# Patient Record
Sex: FEMALE | Race: BLACK OR AFRICAN AMERICAN | NOT HISPANIC OR LATINO | Employment: OTHER | URBAN - METROPOLITAN AREA
[De-identification: names, ages, dates, MRNs, and addresses within clinical notes are randomized per-mention and may not be internally consistent; named-entity substitution may affect disease eponyms.]

---

## 2017-01-23 ENCOUNTER — TELEPHONE (OUTPATIENT)
Dept: TRANSPLANT | Facility: CLINIC | Age: 32
End: 2017-01-23

## 2017-01-23 NOTE — TELEPHONE ENCOUNTER
Patient approved for initial consult visit.  Per Dr. Burciaga patient can be scheduled with other physicians.

## 2017-01-23 NOTE — TELEPHONE ENCOUNTER
Records received for OHT to see Dr. Burciaga. Patient will be scheduled  Accordingly after approval received. OHT episode created.

## 2017-01-23 NOTE — TELEPHONE ENCOUNTER
Someone picked up , then hung up    In efforts to get them again another attempt made in which receive message that there is no voice mail set up

## 2017-01-23 NOTE — TELEPHONE ENCOUNTER
"Attempted to reach pt no voice mail set up.       REFERRAL NOTE:    Cj Silva has been referred to the pre-heart transplant office for consideration for orthotopic heart transplantation by Kendall Donnelly .  Patient's appointments have been scheduled for 1/31/2017.  Information was provided and questions were answered.  Copies of "What to Expect" and "Cardiomyopathy and Heart Transplant" were mailed to the patient.  "

## 2017-01-24 ENCOUNTER — TELEPHONE (OUTPATIENT)
Dept: TRANSPLANT | Facility: CLINIC | Age: 32
End: 2017-01-24

## 2017-01-24 NOTE — TELEPHONE ENCOUNTER
Called in efforts to reach patient to inform her in regards to upcoming scheduled appointment. Patient does not have voicemail set up on phone.

## 2017-02-01 ENCOUNTER — TELEPHONE (OUTPATIENT)
Dept: TRANSPLANT | Facility: CLINIC | Age: 32
End: 2017-02-01

## 2017-02-01 NOTE — TELEPHONE ENCOUNTER
Attempted to contact patient to reschedule appt, did not arrive for scheduled appt on 1/31/17. Unable to leave voicemail, mailbox not set up.

## 2017-02-03 ENCOUNTER — TELEPHONE (OUTPATIENT)
Dept: TRANSPLANT | Facility: CLINIC | Age: 32
End: 2017-02-03

## 2017-02-03 NOTE — TELEPHONE ENCOUNTER
Attempted second time to contact patient to reschedule appt, no show for appt on 1/31/17. Unable to leave voicemail, mailbox not set up.

## 2017-02-06 ENCOUNTER — TELEPHONE (OUTPATIENT)
Dept: TRANSPLANT | Facility: CLINIC | Age: 32
End: 2017-02-06

## 2017-02-06 NOTE — TELEPHONE ENCOUNTER
Attempted to contact patient to reschedule appt, no show for appt on 1/31/17. Unable to leave voicemail, mailbox not set up.

## 2017-02-16 ENCOUNTER — TELEPHONE (OUTPATIENT)
Dept: TRANSPLANT | Facility: CLINIC | Age: 32
End: 2017-02-16

## 2017-02-16 NOTE — TELEPHONE ENCOUNTER
----- Message from Teresita Thompson sent at 2/15/2017  4:10 PM CST -----  Contact: Irene([facility] [place])  Irene([facility] [place]) called, requesting to speak with you in regards to pt's missed appointment.  [phone]. Thank you

## 2017-02-16 NOTE — TELEPHONE ENCOUNTER
Spoke with Irene for Dr Donnelly (Referring physician) updated her on patient new appointment date and time. Understanding verbalized no further questions or concerns at this time.

## 2017-02-16 NOTE — TELEPHONE ENCOUNTER
Received a fax of about 11 pages of recent clinic visit on 2/15/17 from MARY ANN Simpson a copy was made and send to be scanned into patient chart will be viewable under media tab. A copy was given to RUPINDER Solo with notation that patient was scheduled for tomorrow 2/17/17

## 2017-02-16 NOTE — TELEPHONE ENCOUNTER
Spoke with patient, update demographics , rescheduled her appointment for tomorrow, Friday Feb. 17th for 1030 with Dr. Euceda with labs for 0930

## 2017-02-16 NOTE — TELEPHONE ENCOUNTER
----- Message from Teresita Thompson sent at 2/15/2017  4:21 PM CST -----  Contact: pt called  Pt called, states she will like to schedule an upcoming appointment.  [phone]. Thank you

## 2017-02-17 ENCOUNTER — EDUCATION (OUTPATIENT)
Dept: TRANSPLANT | Facility: CLINIC | Age: 32
End: 2017-02-17

## 2017-02-17 ENCOUNTER — INITIAL CONSULT (OUTPATIENT)
Dept: TRANSPLANT | Facility: CLINIC | Age: 32
End: 2017-02-17
Payer: MEDICAID

## 2017-02-17 ENCOUNTER — DOCUMENTATION ONLY (OUTPATIENT)
Dept: TRANSPLANT | Facility: CLINIC | Age: 32
End: 2017-02-17

## 2017-02-17 VITALS
HEART RATE: 79 BPM | DIASTOLIC BLOOD PRESSURE: 69 MMHG | WEIGHT: 153 LBS | BODY MASS INDEX: 28.89 KG/M2 | HEIGHT: 61 IN | SYSTOLIC BLOOD PRESSURE: 114 MMHG

## 2017-02-17 DIAGNOSIS — R42 POSITIONAL LIGHTHEADEDNESS: ICD-10-CM

## 2017-02-17 PROCEDURE — 99204 OFFICE O/P NEW MOD 45 MIN: CPT | Mod: S$PBB,,, | Performed by: INTERNAL MEDICINE

## 2017-02-17 PROCEDURE — 99999 PR PBB SHADOW E&M-EST. PATIENT-LVL III: CPT | Mod: PBBFAC,,, | Performed by: INTERNAL MEDICINE

## 2017-02-17 PROCEDURE — 99213 OFFICE O/P EST LOW 20 MIN: CPT | Mod: PBBFAC | Performed by: INTERNAL MEDICINE

## 2017-02-17 RX ORDER — SPIRONOLACTONE 25 MG/1
25 TABLET ORAL DAILY
COMMUNITY
End: 2022-03-02 | Stop reason: SDUPTHER

## 2017-02-17 RX ORDER — FUROSEMIDE 20 MG/1
20 TABLET ORAL EVERY OTHER DAY
COMMUNITY

## 2017-02-17 RX ORDER — POTASSIUM CHLORIDE 20 MEQ/1
20 TABLET, EXTENDED RELEASE ORAL EVERY OTHER DAY
COMMUNITY
End: 2018-12-05

## 2017-02-17 RX ORDER — CARVEDILOL 25 MG/1
25 TABLET ORAL 2 TIMES DAILY WITH MEALS
COMMUNITY

## 2017-02-17 NOTE — PROGRESS NOTES
At the request of Dr. Euceda, I have been asked to meet patient and provide VAD education. Introduced self and reason for visit. Pt and Walter CONLEY.  Provided written VAD education (red folder). Included in folder is the following: VAD education, wellness contract, acknowledgement of being evaluated for VAD, support group flier, ICU visitation hours, VAD newsletter, Intermacs information, picture of VAD  In body, In[employer] pamphlet, Living with HM II DVD, There is hope pamphlet, Tomorrow depends on the decision we make today patient education pack (HM II), [employer] information, and business cards for all VAD coordinators. Explained that we use 3 different types of pumps here and information on both pumps is in the red folder. I explained the work up process as well.     Explained to look over the entire contents and read the wellness contract, caregiver agreement and acknowledgement form.  Also explained they should bring this folder with them to all clinic visits and if they are admitted to the hospital so we can continue education as needed. Should there be any questions, please write them down and bring with you or feel free to call and we can talk on the phone. All questions answered to their satisfaction as evidence by verbal acknowledgement.

## 2017-02-17 NOTE — LETTER
February 21, 2017        Kendall Donnelly  [address]  [address]  [place] [address]  Phone: [phone]  Fax: [phone]             [facility]  [address]  [facility] [address]  Phone: [phone]   Patient: Cj Silva   MR Number: [id number]   YOB: 1985   Date of Visit: 2/17/2017       Dear Dr. Kendall Donnelly    Thank you for referring Cj Silva to me for evaluation. Attached you will find relevant portions of my assessment and plan of care.    If you have questions, please do not hesitate to call me. I look forward to following Cj Silva along with you.    Sincerely,    Terra Euceda MD    Enclosure    If you would like to receive this communication electronically, please contact [email or web address] or [phone] to request [employer] Link access.    [employer] Link is a tool which provides read-only access to select patient information with whom you have a relationship. Its easy to use and provides real time access to review your patients record including encounter summaries, notes, results, and demographic information.    If you feel you have received this communication in error or would no longer like to receive these types of communications, please e-mail [email or web address]

## 2017-02-17 NOTE — PROGRESS NOTES
Subjective:   Initial evaluation of heart transplant candidacy.    HPI:  Ms. Silva is a 31 y.o. year old Black or  female who has presents to be considered for advanced surgical options (LVAD/OHT). 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 No complications with any of her deliveries. Patient started on medical therapy, admitted to hospital, diuresed, and subsequently has had LifeVest. Despite medical therapy patient states she continues to have symptoms of fatigue, gets tired doing her household chores. Additionally states she has dizziness not necessarily associated with position changes. Some VO, but not with mild activity. Denies N/V/F/C, lightheadedness, dizziness, PND, orthopnea, LE edema, abdominal pain, abdominal pressure. No bloating. No leg swelling anymore.       Past Medical History   Diagnosis Date    Cardiomyopathy     CHF (congestive heart failure)     Hypertension      History reviewed. No pertinent past surgical history.    Family History   Problem Relation Age of Onset    Hypertension Mother     Cancer Mother     Hypertension Father        Review of Systems   Constitution: Positive for malaise/fatigue. Negative for chills, decreased appetite, diaphoresis, fever, weakness, weight gain and weight loss.   HENT: Negative for headaches.    Eyes: Negative for visual disturbance.   Cardiovascular: Positive for dyspnea on exertion. Negative for chest pain, cyanosis, irregular heartbeat, leg swelling, near-syncope, orthopnea (3 pillows), palpitations, paroxysmal nocturnal dyspnea and syncope.   Respiratory: Positive for shortness of breath. Negative for cough, sleep disturbances due to breathing, snoring, sputum production and wheezing.   "  Hematologic/Lymphatic: Negative for adenopathy and bleeding problem. Does not bruise/bleed easily.   Skin: Negative for color change, poor wound healing, rash, skin cancer and suspicious lesions.   Musculoskeletal: Negative for back pain, falls, gout, joint pain and muscle weakness.   Gastrointestinal: Negative for bloating, abdominal pain, anorexia, constipation, diarrhea, heartburn, hematemesis, hematochezia, melena, nausea and vomiting.   Genitourinary: Negative for nocturia and urgency.   Neurological: Positive for dizziness. Negative for excessive daytime sleepiness, focal weakness, light-headedness, paresthesias and tremors.   Psychiatric/Behavioral: Negative for depression and memory loss. The patient does not have insomnia and is not nervous/anxious.        Objective:   Blood pressure 114/69, pulse 79, height 5' 1" (1.549 m), weight 69.4 kg (153 lb), last menstrual period 02/14/2017.body mass index is 28.91 kg/(m^2).    Physical Exam   Constitutional: She is oriented to person, place, and time. She appears well-developed and well-nourished. No distress.   HENT:   Head: Normocephalic and atraumatic.   Right Ear: External ear normal.   Left Ear: External ear normal.   Nose: Nose normal.   Mouth/Throat: Oropharynx is clear and moist. No oropharyngeal exudate.   Eyes: Pupils are equal, round, and reactive to light. No scleral icterus.   Neck: Trachea normal and normal range of motion. Neck supple. No hepatojugular reflux and no JVD present.   Cardiovascular: Normal rate, regular rhythm, S1 normal, S2 normal, intact distal pulses and normal pulses.  PMI is not displaced.  Exam reveals gallop.    Pulmonary/Chest: Effort normal and breath sounds normal. She has no wheezes. She has no rhonchi. She has no rales.   Abdominal: Soft. Normal appearance and bowel sounds are normal. She exhibits no distension, no ascites and no mass. There is no tenderness.   Lymphadenopathy:     She has no cervical adenopathy. "   Neurological: She is alert and oriented to person, place, and time. Gait normal.   Skin: Skin is warm, dry and intact. She is not diaphoretic. No cyanosis. Nails show no clubbing.   Psychiatric: She has a normal mood and affect. Her speech is normal.   Nursing note and vitals reviewed.      Labs:      Chemistry        Component Value Date/Time     02/17/2017 1123    K 4.1 02/17/2017 1123     02/17/2017 1123    CO2 27 02/17/2017 1123    BUN 8 02/17/2017 1123    CREATININE 0.9 02/17/2017 1123    GLU 89 02/17/2017 1123        Component Value Date/Time    CALCIUM 9.5 02/17/2017 1123    ALKPHOS 94 02/17/2017 1123    AST 15 02/17/2017 1123    ALT 12 02/17/2017 1123    BILITOT 0.4 02/17/2017 1123          No results found for: MG  Lab Results   Component Value Date    WBC 5.16 02/17/2017    HGB 13.2 02/17/2017    HCT 42.5 02/17/2017    MCV 84 02/17/2017     02/17/2017     BNP   Date Value Ref Range Status   02/17/2017 163 (H) 0 - 99 pg/mL Final     Comment:     Values of less than 100 pg/ml are consistent with non-CHF populations.       Labs were reviewed with the patient.    Assessment:      1. Peripartum cardiomyopathy    2. Positional lightheadedness        Plan:   Patient appears to be well compensated on exam today, however difficult to say for sure, as a young patient, can mask symptoms/illness. Would like to proceed with risk stratification to see where we stand. Does seem difficult for patient to travel here, as she cancelled the last couple appointments before making the first one- will see if she can return in  2-3 weeks for CPX. Echo later today if possible. If functional status indicates, will proceed with RHC and possible evaluation for advanced options.      Patient is now NYHA III ACC stage D  Recommend 2 gram sodium restriction and 1500cc fluid restriction.  Encourage physical activity with graded exercise program.  Requested patient to weigh themselves daily, and to notify us if their  weight increases by more than 3 lbs in 1 day or 5 lbs in 1 week.     Transplant Candidacy: Patient is a 31 y.o. year old female with heart failure is being seen for possible OHT. In my opinion, she is  a suitable OHT candidate. Patient did meet with MCS and/or pre-transplant coordinator at the end of this visit for workup. she is scheduled for risk stratification testing.    UNOS Patient Status  Functional Status: 70% - Cares for self: unable to carry on normal activity or active work  Physical Capacity: No Limitations  Working for Income: No  If no, reason not working: Patient Choice - Homemaker    Terra Euceda MD

## 2017-02-17 NOTE — PROGRESS NOTES
PRE-EDUCATION BOOKLET NOTE:    Met with Jodicorinne DelgadilloRicardo, accompanied by her significant other, had a brief discussion on the heart transplant evaluation process.    Heart Transplant Educational Booklet given to patient, which included the following handouts:  · Cardiomyopathy and Heart Transplantation  · Pre-transplant Evaluation Process  · Ventricular Assist Devices  · Wellness Contract  · Heart Transplant Information Outline  · Recipient Informed Consent  · Discharge Instructions for Patients with Heart Failure  · Advanced Directives  · Suggested web sites  · Multiple listing protocol and UNOS toll free numbers    Contact information provided.  All questions answered to patient's satisfaction.  Educational session to be arranged per .  Patient instructed to bring heart transplant educational booklet to the education session. Voiced understanding.

## 2017-02-17 NOTE — MR AVS SNAPSHOT
[facility]  [address]  [place] [address]  Phone: [phone]                  Cj Silva   2017 10:30 AM   Initial consult    Description:  Female : 1985   Provider:  Terra Euceda MD   Department:  [facility]           Reason for Visit     Heart Transplant Pre-evaluation           Diagnoses this Visit        Comments    Peripartum cardiomyopathy    -  Primary     Positional lightheadedness                To Do List           Future Appointments        Provider Department Dept Phone    3/21/2017 9:15 AM CPX [facility] - Echo/Stress Lab [phone]    3/21/2017 11:00 AM ECHO, [facility] - Echo/Stress Lab [phone]    3/21/2017 1:30 PM Terra Euceda MD [facility] [phone]      Goals (5 Years of Data)     None      Follow-Up and Disposition     Return in about 4 weeks (around 3/17/2017) for clinic, labs, cpx next visit; echo today at 1pm.      [facility]     [facility] Nurse Care Line - 24/7 Assistance  Registered nurses in the [facility] provide clinical advisement, health education, appointment booking, and other advisory services.  Call for this free service at [phone].             Medications           Message regarding Medications     Verify the changes and/or additions to your medication regime listed below are the same as discussed with your clinician today.  If any of these changes or additions are incorrect, please notify your healthcare provider.             Verify that the below list of medications is an accurate representation of the medications you are currently taking.  If none reported, the list may be blank. If incorrect, please contact your healthcare provider. Carry this list with you in case of emergency.           Current Medications     carvedilol (COREG) 25 MG tablet Take 25 mg by mouth 2 (two) times daily with meals.    furosemide (LASIX) 20 MG tablet Take 20 mg by  "mouth 2 (two) times daily.    potassium chloride SA (K-DUR,KLOR-CON) 20 MEQ tablet Take 20 mEq by mouth once daily.    sacubitril-valsartan (ENTRESTO) 24-26 mg per tablet Take 1 tablet by mouth 2 (two) times daily.    spironolactone (ALDACTONE) 25 MG tablet Take 25 mg by mouth once daily.           Clinical Reference Information           Your Vitals Were     BP Pulse Height Weight Last Period BMI    114/69 (BP Location: Right arm, Patient Position: Sitting, BP Method: Automatic) 79 5' 1" (1.549 m) 69.4 kg (153 lb) 02/14/2017 28.91 kg/m2      Blood Pressure          Most Recent Value    BP  114/69      Allergies as of 2/17/2017     No Known Allergies      Immunizations Administered on Date of Encounter - 2/17/2017     None      Orders Placed During Today's Visit     Future Labs/Procedures Expected by Expires    2D Echo w/ Color Flow Doppler  As directed 2/17/2018    Recurring Lab Work Interval Expires    CPX STUDY   2/17/2018      Maintenance Dialysis History     Patient has no recorded history of maintenance dialysis.      Transplant Information        Txp Date Organ Coordinator Care Team     Heart Tatiana Manriquez Referring Physician:  Kendall Donnelly MD   Corresponding Physician:  Kendall Donnelly MD         [facility] Sign-Up     Activating your [facility] account is as easy as 1-2-3!     1) Visit [email or web address], select Sign Up Now, enter this activation code and your date of birth, then select Next.  [email or web address]  Expires: 4/3/2017 12:26 PM      2) Create a username and password to use when you visit [facility] in the future and select a security question in case you lose your password and select Next.    3) Enter your e-mail address and click Sign Up!    Additional Information  If you have questions, please e-mail [email or web address] or call [phone] to talk to our [facility] staff. Remember, [facility] is NOT to be used for urgent needs. For medical emergencies, dial 911.         Language Assistance Services  "    ATTENTION: Language assistance services are available, free of charge. Please call [phone].      ATENCIÓN: Si habla debi, tiene a rajput disposición servicios gratuitos de asistencia lingüística. Llame al [phone].     CHÚ Ý: N?u b?n nói Ti?ng Vi?t, có các d?ch v? h? tr? ngôn ng? mi?n phí dành cho b?n. G?i s? [phone].         [facility] complies with applicable Federal civil rights laws and does not discriminate on the basis of race, color, national origin, age, disability, or sex.

## 2017-03-21 ENCOUNTER — DOCUMENTATION ONLY (OUTPATIENT)
Dept: CARDIOLOGY | Facility: CLINIC | Age: 32
End: 2017-03-21

## 2017-03-21 ENCOUNTER — OFFICE VISIT (OUTPATIENT)
Dept: TRANSPLANT | Facility: CLINIC | Age: 32
End: 2017-03-21
Payer: MEDICAID

## 2017-03-21 ENCOUNTER — HOSPITAL ENCOUNTER (OUTPATIENT)
Dept: CARDIOLOGY | Facility: CLINIC | Age: 32
Discharge: HOME | End: 2017-03-21
Payer: MEDICAID

## 2017-03-21 VITALS
BODY MASS INDEX: 29.1 KG/M2 | HEIGHT: 61 IN | WEIGHT: 154.13 LBS | DIASTOLIC BLOOD PRESSURE: 77 MMHG | SYSTOLIC BLOOD PRESSURE: 113 MMHG | HEART RATE: 74 BPM

## 2017-03-21 DIAGNOSIS — R42 POSITIONAL LIGHTHEADEDNESS: Primary | ICD-10-CM

## 2017-03-21 DIAGNOSIS — I42.9 CARDIOMYOPATHY: ICD-10-CM

## 2017-03-21 DIAGNOSIS — R42 POSITIONAL LIGHTHEADEDNESS: ICD-10-CM

## 2017-03-21 LAB
DIASTOLIC DYSFUNCTION: NO
ESTIMATED PA SYSTOLIC PRESSURE: 26
RETIRED EF AND QEF - SEE NOTES: 20 (ref 55–65)
TRICUSPID VALVE REGURGITATION: ABNORMAL

## 2017-03-21 PROCEDURE — 99999 PR PBB SHADOW E&M-EST. PATIENT-LVL III: CPT | Mod: PBBFAC,,, | Performed by: INTERNAL MEDICINE

## 2017-03-21 PROCEDURE — 93306 TTE W/DOPPLER COMPLETE: CPT | Mod: 26,S$PBB,, | Performed by: INTERNAL MEDICINE

## 2017-03-21 PROCEDURE — 94621 CARDIOPULM EXERCISE TESTING: CPT | Mod: 26,S$PBB,, | Performed by: INTERNAL MEDICINE

## 2017-03-21 PROCEDURE — 99213 OFFICE O/P EST LOW 20 MIN: CPT | Mod: S$PBB,,, | Performed by: INTERNAL MEDICINE

## 2017-03-21 NOTE — MR AVS SNAPSHOT
[facility]  [address]  [place] [address]  Phone: [phone]                  Cj Silva   3/21/2017 1:30 PM   Office Visit    Description:  Female : 1985   Provider:  Terra Euceda MD   Department:  [facility]           Reason for Visit     Heart Transplant Pre-evaluation           Diagnoses this Visit        Comments    Positional lightheadedness    -  Primary     Peripartum cardiomyopathy                To Do List           Future Appointments        Provider Department Dept Phone    3/21/2017 2:30 PM HEARTTRANSPLANT, [facility] [phone]      Goals (5 Years of Data)     None      Follow-Up and Disposition     Return in about 3 months (around 2017) for clinic, labs, cpx.      [facility]     [facility] Nurse Care Line -  Assistance  Registered nurses in the [facility] provide clinical advisement, health education, appointment booking, and other advisory services.  Call for this free service at [phone].             Medications           Message regarding Medications     Verify the changes and/or additions to your medication regime listed below are the same as discussed with your clinician today.  If any of these changes or additions are incorrect, please notify your healthcare provider.             Verify that the below list of medications is an accurate representation of the medications you are currently taking.  If none reported, the list may be blank. If incorrect, please contact your healthcare provider. Carry this list with you in case of emergency.           Current Medications     carvedilol (COREG) 25 MG tablet Take 25 mg by mouth 2 (two) times daily with meals.    furosemide (LASIX) 20 MG tablet Take 20 mg by mouth 2 (two) times daily.    potassium chloride SA (K-DUR,KLOR-CON) 20 MEQ tablet Take 20 mEq by mouth once daily.    sacubitril-valsartan (ENTRESTO) 24-26 mg per tablet Take 1  "tablet by mouth 2 (two) times daily.    spironolactone (ALDACTONE) 25 MG tablet Take 25 mg by mouth once daily.           Clinical Reference Information           Your Vitals Were     BP Pulse Height Weight BMI    113/77 74 5' 1" (1.549 m) 69.9 kg (154 lb 1.6 oz) 29.12 kg/m2      Blood Pressure          Most Recent Value    BP  113/77      Allergies as of 3/21/2017     No Known Allergies      Immunizations Administered on Date of Encounter - 3/21/2017     None      Orders Placed During Today's Visit     Recurring Lab Work Interval Expires    Brain natriuretic peptide   3/22/2019    Comprehensive metabolic panel   3/22/2019    CPX STUDY   3/21/2018      Maintenance Dialysis History     Patient has no recorded history of maintenance dialysis.      Transplant Information        Txp Date Organ Coordinator Care Team     Heart Tatiana Mitra Referring Physician:  Kendall Donnelly MD   Corresponding Physician:  Kendall Donnelly MD         [facility] Sign-Up     Activating your [facility] account is as easy as 1-2-3!     1) Visit [email or web address], select Sign Up Now, enter this activation code and your date of birth, then select Next.  [email or web address]  Expires: 4/3/2017  1:26 PM      2) Create a username and password to use when you visit [facility] in the future and select a security question in case you lose your password and select Next.    3) Enter your e-mail address and click Sign Up!    Additional Information  If you have questions, please e-mail [email or web address] or call [phone] to talk to our [facility] staff. Remember, [facility] is NOT to be used for urgent needs. For medical emergencies, dial 911.         Language Assistance Services     ATTENTION: Language assistance services are available, free of charge. Please call [phone].      ATENCIÓN: Si habla debi, tiene a rajput disposición servicios gratuitos de asistencia lingüística. Llame al [phone].     CHÚ Ý: N?u b?n nói Ti?ng Vi?t, có các d?ch v? h? " tr? matt hay? mi?n phí dành cho b?n. G?i s? [phone].         [facility] complies with applicable Federal civil rights laws and does not discriminate on the basis of race, color, national origin, age, disability, or sex.

## 2017-03-21 NOTE — LETTER
March 26, 2017        Kendall Donnelly  [address]  [address]  [place] [address]  Phone: [phone]  Fax: [phone]             [facility]  [address]  [facility] [address]  Phone: [phone]   Patient: Cj Silva   MR Number: [id number]   YOB: 1985   Date of Visit: 3/21/2017       Dear Dr. Kendall Donnelly    Thank you for referring Cj Silva to me for evaluation. Attached you will find relevant portions of my assessment and plan of care.    If you have questions, please do not hesitate to call me. I look forward to following Cj Silva along with you.    Sincerely,    Terra Euceda MD    Enclosure    If you would like to receive this communication electronically, please contact [email or web address] or [phone] to request "[employer] Link access.    "[employer] Link is a tool which provides read-only access to select patient information with whom you have a relationship. Its easy to use and provides real time access to review your patients record including encounter summaries, notes, results, and demographic information.    If you feel you have received this communication in error or would no longer like to receive these types of communications, please e-mail [email or web address]

## 2017-03-21 NOTE — PROGRESS NOTES
Patient identified by 2 identifiers. Denies previous reactions to blood transfusions, and no known holes in heart.  Allergies reviewed.  Procedure explained & consent obtained.  22 g IV placed to Rt FA after failed attemp, flushed w/ 10cc NS pre & post contrast administration.  3cc Optison administered, echo images obtained.  Pt tolerated procedure well.  IV D/C'ed, preasure dsg applied.  Pt D/C'ed to home.

## 2017-03-21 NOTE — PROGRESS NOTES
Subjective:   Initial evaluation of heart transplant candidacy.    HPI:  Ms. Silva is a 31 y.o. year old Black or  female who has presents to be considered for advanced surgical options (LVAD/OHT). 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 Initial consult a few weeks prior, has been doing well since then, and actually states she has improved significantly since then. Able to do a lot more, feels stronger, less dizziness although she admits is a lot better. Not getting short of breath with activities at home. Denies N/V/F/C, lightheadedness, dizziness, PND, orthopnea, LE edema, abdominal pain, abdominal pressure. No bloating. No leg swelling anymore.     TTE 03/21/17:    1 - Severely depressed left ventricular systolic function (EF 20-25%).     2 - Mild left atrial enlargement.     3 - Low normal to mildly depressed right ventricular systolic function .     4 - The estimated PA systolic pressure is 26 mmHg.     5 - Mild tricuspid regurgitation.     CPX 03/21/17:  1)  Resting spirometry reveals an FVC = 2.1L which is 66% of predicted, an FEV1 of 1.7L, which is 61% of predicted and an FEV1/FVC ratio of 81%. The MVV = 80 L/min, which is 78% of predicted.  2) The respiratory exchange ratio (RER) was 1.03, suggesting an adequate effort.  3) The breathing reserve is calculated at 14%, which is reduced. Oxygen saturation with exercise remained normal. Due to the absence of symptoms of dyspnea, this reduction in breathing reserve may be due to a sub-clinical ventilatory defect versus a falsely reduced baseline MVV   4) The PkVO2 was 22.3 ml/kg/min which is 60% of predicted equating to a functional capacity of 6.4 METS indicating moderate functional impairment.   5) The anaerobic threshold (AT), which occurred at a heart rate of 125bpm, was 18.5  "ml/kg/min, which is 50% of the predicted VO2 and is normal.   6) The PkVO2 Lean was 29.15 ml/kg of lean body weight/min indicating a good prognosis in heart failure.   7) The VE/VCO2 decreased by -24% from rest to AT. The VE/VCO2 [place] was 36.3.  The Resting PetCO2 was 33.0.     Review of Systems   Constitution: Positive for malaise/fatigue. Negative for chills, decreased appetite, diaphoresis, fever, weakness, weight gain and weight loss.   HENT: Negative for headaches.    Eyes: Negative for visual disturbance.   Cardiovascular: Positive for dyspnea on exertion. Negative for chest pain, cyanosis, irregular heartbeat, leg swelling, near-syncope, orthopnea (down to 1 pillow at night, no PND), palpitations, paroxysmal nocturnal dyspnea and syncope.   Respiratory: Positive for shortness of breath. Negative for cough, sleep disturbances due to breathing, snoring, sputum production and wheezing.    Hematologic/Lymphatic: Negative for adenopathy and bleeding problem. Does not bruise/bleed easily.   Skin: Negative for color change, poor wound healing, rash, skin cancer and suspicious lesions.   Musculoskeletal: Negative for back pain, falls, gout, joint pain and muscle weakness.   Gastrointestinal: Negative for bloating, abdominal pain, anorexia, constipation, diarrhea, heartburn, hematemesis, hematochezia, melena, nausea and vomiting.   Genitourinary: Negative for nocturia and urgency.   Neurological: Positive for dizziness. Negative for excessive daytime sleepiness, focal weakness, light-headedness, paresthesias and tremors.   Psychiatric/Behavioral: Negative for depression and memory loss. The patient does not have insomnia and is not nervous/anxious.        Objective:   Blood pressure 113/77, pulse 74, height 5' 1" (1.549 m), weight 69.9 kg (154 lb 1.6 oz).body mass index is 29.12 kg/(m^2).    Physical Exam   Constitutional: She is oriented to person, place, and time. She appears well-developed and well-nourished. No " distress.   HENT:   Head: Normocephalic and atraumatic.   Right Ear: External ear normal.   Left Ear: External ear normal.   Nose: Nose normal.   Mouth/Throat: Oropharynx is clear and moist. No oropharyngeal exudate.   Eyes: Pupils are equal, round, and reactive to light. No scleral icterus.   Neck: Trachea normal and normal range of motion. Neck supple. No hepatojugular reflux and no JVD present.   Cardiovascular: Normal rate, regular rhythm, S1 normal, S2 normal, intact distal pulses and normal pulses.  PMI is not displaced.  Exam reveals gallop.    Pulmonary/Chest: Effort normal and breath sounds normal. She has no wheezes. She has no rhonchi. She has no rales.   Abdominal: Soft. Normal appearance and bowel sounds are normal. She exhibits no distension, no ascites and no mass. There is no tenderness.   Lymphadenopathy:     She has no cervical adenopathy.   Neurological: She is alert and oriented to person, place, and time. Gait normal.   Skin: Skin is warm, dry and intact. She is not diaphoretic. No cyanosis. Nails show no clubbing.   Psychiatric: She has a normal mood and affect. Her speech is normal.   Nursing note and vitals reviewed.    Labs:      Chemistry        Component Value Date/Time     02/17/2017 1123    K 4.1 02/17/2017 1123     02/17/2017 1123    CO2 27 02/17/2017 1123    BUN 8 02/17/2017 1123    CREATININE 0.9 02/17/2017 1123    GLU 89 02/17/2017 1123        Component Value Date/Time    CALCIUM 9.5 02/17/2017 1123    ALKPHOS 94 02/17/2017 1123    AST 15 02/17/2017 1123    ALT 12 02/17/2017 1123    BILITOT 0.4 02/17/2017 1123          Lab Results   Component Value Date    WBC 5.16 02/17/2017    HGB 13.2 02/17/2017    HCT 42.5 02/17/2017    MCV 84 02/17/2017     02/17/2017     BNP   Date Value Ref Range Status   02/17/2017 163 (H) 0 - 99 pg/mL Final     Comment:     Values of less than 100 pg/ml are consistent with non-CHF populations.       Labs were reviewed with the  patient.    Assessment:      1. Positional lightheadedness    2. Peripartum cardiomyopathy        Plan:   Patient additionally states she is still feeling some positional dizziness, however on exam her volume status appears euvolemic, not hypovelemic, and she states her blood pressures do not change. If this is resolved when she sees her local cardiologist, would recommend increasing the entresto to the next highest dose. Will send a copy of this note to her local cardiologist who she is going to follow up with in April.    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   Will have them return to see us in clinic in 3 months with repeat CPX, anticipate she will have an ICD placed before then.      Patient is now NYHA III ACC stage D  Recommend 2 gram sodium restriction and 1500cc fluid restriction.  Encourage physical activity with graded exercise program.  Requested patient to weigh themselves daily, and to notify us if their weight increases by more than 3 lbs in 1 day or 5 lbs in 1 week.     Transplant Candidacy: Patient is a 31 y.o. year old female with heart failure is being seen for possible OHT. In my opinion, she is  a suitable OHT candidate. Patient did meet with [facility]CS and/or pre-transplant coordinator at the end of this visit for workup. she is scheduled for risk stratification testing.    UNOS Patient Status  Functional Status: 70% - Cares for self: unable to carry on normal activity or active work  Physical Capacity: No Limitations  Working for Income: No  If no, reason not working: Patient Choice - Homemaker    Terra Euceda MD

## 2017-06-19 ENCOUNTER — HOSPITAL ENCOUNTER (OUTPATIENT)
Dept: CARDIOLOGY | Facility: CLINIC | Age: 32
Discharge: HOME | End: 2017-06-19
Payer: MEDICAID

## 2017-06-19 ENCOUNTER — OFFICE VISIT (OUTPATIENT)
Dept: TRANSPLANT | Facility: CLINIC | Age: 32
End: 2017-06-19
Payer: MEDICAID

## 2017-06-19 ENCOUNTER — CLINICAL SUPPORT (OUTPATIENT)
Dept: TRANSPLANT | Facility: CLINIC | Age: 32
End: 2017-06-19
Payer: MEDICAID

## 2017-06-19 VITALS
SYSTOLIC BLOOD PRESSURE: 137 MMHG | WEIGHT: 171.31 LBS | DIASTOLIC BLOOD PRESSURE: 80 MMHG | HEART RATE: 59 BPM | HEIGHT: 61 IN | BODY MASS INDEX: 32.34 KG/M2

## 2017-06-19 DIAGNOSIS — R42 POSITIONAL LIGHTHEADEDNESS: ICD-10-CM

## 2017-06-19 LAB — DIASTOLIC DYSFUNCTION: NO

## 2017-06-19 PROCEDURE — 94621 CARDIOPULM EXERCISE TESTING: CPT | Mod: PBBFAC,NTX | Performed by: INTERNAL MEDICINE

## 2017-06-19 PROCEDURE — 99214 OFFICE O/P EST MOD 30 MIN: CPT | Mod: S$PBB,NTX,, | Performed by: INTERNAL MEDICINE

## 2017-06-19 PROCEDURE — 99999 PR PBB SHADOW E&M-EST. PATIENT-LVL III: CPT | Mod: PBBFAC,TXP,, | Performed by: INTERNAL MEDICINE

## 2017-06-19 NOTE — PROGRESS NOTES
Subjective:   Initial evaluation of heart transplant candidacy.    HPI:  Ms. Silva is a 31 y.o. year old Black or  female who has presents to be considered for advanced surgical options (LVAD/OHT). 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 Patient has progressively improved significantly since I first met her, today states she has no furhter symptoms, no dizziness/lightheadedness even, and can do everything she wants to do, truly describes NYHA FC I functionality.  Denies N/V/F/C, lightheadedness, dizziness, PND, orthopnea, LE edema, abdominal pain, abdominal pressure. Home blood pressures running in the 120s.     TTE 03/21/17:    1 - Severely depressed left ventricular systolic function (EF 20-25%).     2 - Mild left atrial enlargement.     3 - Low normal to mildly depressed right ventricular systolic function .     4 - The estimated PA systolic pressure is 26 mmHg.     5 - Mild tricuspid regurgitation.     CPX 06/19/17:  1)  Resting spirometry reveals an FVC = 2.2L which is 69% of predicted, an FEV1 of 1.9L, which is 66% of predicted and an FEV1/FVC ratio of 83%. The MVV = 74 L/min, which is 72% of predicted.  2) The respiratory exchange ratio (RER) was 1.19, suggesting a good effort.  3) The breathing reserve is calculated at 21%, which is borderline reduced. Oxygen saturation with exercise remained normal. Due to the absence of symptoms of dyspnea, this reduction in breathing reserve may be due to a sub-clinical ventilatory defect versus a falsely reduced baseline MVV   4) The PkVO2 was 20.1 ml/kg/min which is 54% of predicted equating to a functional capacity of 5.7 METS indicating moderate to severe functional impairment.   5) The anaerobic threshold (AT), which occurred at a heart rate of 137bpm, was 14.4 ml/kg/min, which is 39% of  "the predicted VO2 and is reduced.   6) The PkVO2 Lean was 26.73 ml/kg of lean body weight/min indicating a good prognosis in heart failure.   7) The VE/VCO2 decreased by -43% from rest to AT. The VE/VCO2 [place] was 25.5.  The Resting PetCO2 was 35.8.      Review of Systems   Constitution: Positive for weight gain. Negative for chills, decreased appetite, diaphoresis, fever, weakness, malaise/fatigue and weight loss.   HENT: Negative for headaches.    Eyes: Negative for visual disturbance.   Cardiovascular: Negative for chest pain, cyanosis, dyspnea on exertion, irregular heartbeat, leg swelling, near-syncope, orthopnea (down to 1 pillow at night, no PND), palpitations, paroxysmal nocturnal dyspnea and syncope.   Respiratory: Negative for cough, shortness of breath, sleep disturbances due to breathing, snoring, sputum production and wheezing.    Hematologic/Lymphatic: Negative for adenopathy and bleeding problem. Does not bruise/bleed easily.   Skin: Negative for color change, poor wound healing, rash, skin cancer and suspicious lesions.   Musculoskeletal: Negative for back pain, falls, gout, joint pain and muscle weakness.   Gastrointestinal: Negative for bloating, abdominal pain, anorexia, constipation, diarrhea, heartburn, hematemesis, hematochezia, melena, nausea and vomiting.   Genitourinary: Negative for nocturia and urgency.   Neurological: Negative for excessive daytime sleepiness, dizziness, focal weakness, light-headedness, paresthesias and tremors.   Psychiatric/Behavioral: Negative for depression and memory loss. The patient does not have insomnia and is not nervous/anxious.        Objective:   Blood pressure 137/80, pulse (!) 59, height 5' 1" (1.549 m), weight 77.7 kg (171 lb 4.8 oz).body mass index is 32.37 kg/m².    Physical Exam   Constitutional: She is oriented to person, place, and time. She appears well-developed and well-nourished. No distress.   HENT:   Head: Normocephalic and atraumatic.   Right " Ear: External ear normal.   Left Ear: External ear normal.   Nose: Nose normal.   Mouth/Throat: Oropharynx is clear and moist. No oropharyngeal exudate.   Eyes: Pupils are equal, round, and reactive to light. No scleral icterus.   Neck: Trachea normal and normal range of motion. Neck supple. No hepatojugular reflux and no JVD present.   Cardiovascular: Normal rate, regular rhythm, S1 normal, S2 normal, intact distal pulses and normal pulses.  PMI is not displaced.  Exam reveals no gallop.    Pulmonary/Chest: Effort normal and breath sounds normal. She has no wheezes. She has no rhonchi. She has no rales.   Abdominal: Soft. Normal appearance and bowel sounds are normal. She exhibits no distension, no ascites and no mass. There is no tenderness.   Lymphadenopathy:     She has no cervical adenopathy.   Neurological: She is alert and oriented to person, place, and time. Gait normal.   Skin: Skin is warm, dry and intact. She is not diaphoretic. No cyanosis. Nails show no clubbing.   Psychiatric: She has a normal mood and affect. Her speech is normal.   Nursing note and vitals reviewed.    Labs:      Chemistry        Component Value Date/Time     06/19/2017 0914    K 3.6 06/19/2017 0914     06/19/2017 0914    CO2 23 06/19/2017 0914    BUN 14 06/19/2017 0914    CREATININE 0.8 06/19/2017 0914    GLU 93 06/19/2017 0914        Component Value Date/Time    CALCIUM 9.5 06/19/2017 0914    ALKPHOS 91 06/19/2017 0914    AST 13 06/19/2017 0914    ALT 10 06/19/2017 0914    BILITOT 0.3 06/19/2017 0914          Lab Results   Component Value Date    WBC 5.16 02/17/2017    HGB 13.2 02/17/2017    HCT 42.5 02/17/2017    MCV 84 02/17/2017     02/17/2017     BNP   Date Value Ref Range Status   06/19/2017 48 0 - 99 pg/mL Final     Comment:     Values of less than 100 pg/ml are consistent with non-CHF populations.   02/17/2017 163 (H) 0 - 99 pg/mL Final     Comment:     Values of less than 100 pg/ml are consistent with  non-CHF populations.     Labs were reviewed with the patient.    Assessment:      1. Peripartum cardiomyopathy        Plan:   Volume status is stable, BNP is much lower, patient states she feels much better overall. Denies any lightheadedness/dizziness even anymore.   Patient states she is having significant problems getting ICD placed locally, does not have a local cardiologist that would be able to do it.  She is going to see if she can be referred to someone locally. If not able to, will set her up with our EP team here for possible ICD- would like to get one more echo before proceeding just in case there has been further recovery of LVEF.   Patient is now NYHA I ACC stage D  Recommend 2 gram sodium restriction and 1500cc fluid restriction.  Encourage physical activity with graded exercise program.  Requested patient to weigh themselves daily, and to notify us if their weight increases by more than 3 lbs in 1 day or 5 lbs in 1 week.     Transplant Candidacy: Patient is a 31 y.o. year old female with heart failure is being seen for possible OHT. In my opinion, she is  a suitable OHT candidate. Patient did meet with [facility]CS and/or pre-transplant coordinator at the end of this visit for workup. she is scheduled for risk stratification testing.    UNOS Patient Status  Functional Status: 70% - Cares for self: unable to carry on normal activity or active work  Physical Capacity: No Limitations  Working for Income: No  If no, reason not working: Patient Choice - Homemaker    Terra Euceda MD

## 2017-06-19 NOTE — LETTER
June 19, 2017        Kendall Donnelly  [address]  [place] [address]  Phone: [phone]  Fax: [phone]             [facility]  [address]  [facility] [address]  Phone: [phone]   Patient: Cj Silva   MR Number: [id number]   YOB: 1985   Date of Visit: 6/19/2017       Dear Dr. Kendall Donnelly    Thank you for referring Cj Silva to me for evaluation. Attached you will find relevant portions of my assessment and plan of care.    If you have questions, please do not hesitate to call me. I look forward to following Cj Silva along with you.    Sincerely,    Terra Euceda MD    Enclosure    If you would like to receive this communication electronically, please contact [email or web address] or [phone] to request [employer] access.    [employer] is a tool which provides read-only access to select patient information with whom you have a relationship. Its easy to use and provides real time access to review your patients record including encounter summaries, notes, results, and demographic information.    If you feel you have received this communication in error or would no longer like to receive these types of communications, please e-mail [email or web address]

## 2017-06-19 NOTE — PATIENT INSTRUCTIONS
See if your local cardiologist can refer you for an electrophysiologist for possible defibrillator.    Will see if you can get a repeat echo locally before proceeding with possible ICD, in case there has been further recovery.

## 2017-06-19 NOTE — PROGRESS NOTES
"Pt presents to clinic for follow up with .  Pt denies bringing her LVAD folder with her today.  Pt reports she did review the materials with her fiancee but it "has been a while".  Pt given "discharge instructions" and "care of the driveline" to review as well.  Pt encouraged to review LVAD red folder education with her fiancee and bring with her to her next clinic visit.  Pt denies any questions at this time but did verbalize "I don't need this right this minute right?"  Pt re-educated on workup process and VAD education process.  Pt verbalizes gratitude for explanation.  Will follow up with pt soon.    "

## 2017-07-11 ENCOUNTER — TELEPHONE (OUTPATIENT)
Dept: TRANSPLANT | Facility: CLINIC | Age: 32
End: 2017-07-11

## 2017-07-24 ENCOUNTER — TELEPHONE (OUTPATIENT)
Dept: ELECTROPHYSIOLOGY | Facility: CLINIC | Age: 32
End: 2017-07-24

## 2017-07-24 NOTE — TELEPHONE ENCOUNTER
Noticed pt missed all other appts today and called to verify pt was going to make appt today or see if she would like to r/s. Lm to rtc

## 2017-07-25 ENCOUNTER — TELEPHONE (OUTPATIENT)
Dept: TRANSPLANT | Facility: CLINIC | Age: 32
End: 2017-07-25

## 2017-07-25 DIAGNOSIS — I50.9 CONGESTIVE HEART FAILURE, UNSPECIFIED CONGESTIVE HEART FAILURE CHRONICITY, UNSPECIFIED CONGESTIVE HEART FAILURE TYPE: Primary | ICD-10-CM

## 2017-07-25 NOTE — TELEPHONE ENCOUNTER
Attempted to contact patient regarding missed appts on 7/24/17 for HTS clinic, labs and EKG, voice message left with contact number to call back. Will await return call.

## 2017-07-25 NOTE — TELEPHONE ENCOUNTER
Patient returned call, HTS appt, lab and 2D echo rescheduled for 8/8/17. Appt letter placed in mail.

## 2017-08-07 ENCOUNTER — TELEPHONE (OUTPATIENT)
Dept: ELECTROPHYSIOLOGY | Facility: CLINIC | Age: 32
End: 2017-08-07

## 2017-08-07 NOTE — TELEPHONE ENCOUNTER
Notice patient missed earlier appts.  Calling to see if she is coming in this afternoon, or if she needs to reschedule.  ABEL on VM.

## 2017-08-24 ENCOUNTER — TELEPHONE (OUTPATIENT)
Dept: TRANSPLANT | Facility: CLINIC | Age: 32
End: 2017-08-24

## 2017-08-24 NOTE — TELEPHONE ENCOUNTER
Received voice message from pt to reschedule cardiac US and EKG, scheduled for 9/15/17. Appts confirmed with pt. Advised to keep and schedule appt with EP for same day (no show in past). Voiced understanding. Appt letter placed in mail.

## 2017-09-15 ENCOUNTER — HOSPITAL ENCOUNTER (OUTPATIENT)
Dept: CARDIOLOGY | Facility: CLINIC | Age: 32
Discharge: HOME | End: 2017-09-15
Payer: MEDICAID

## 2017-09-15 ENCOUNTER — OFFICE VISIT (OUTPATIENT)
Dept: TRANSPLANT | Facility: CLINIC | Age: 32
End: 2017-09-15
Payer: MEDICAID

## 2017-09-15 ENCOUNTER — INITIAL CONSULT (OUTPATIENT)
Dept: ELECTROPHYSIOLOGY | Facility: CLINIC | Age: 32
End: 2017-09-15
Payer: MEDICAID

## 2017-09-15 VITALS
HEART RATE: 64 BPM | HEIGHT: 61 IN | BODY MASS INDEX: 32.67 KG/M2 | DIASTOLIC BLOOD PRESSURE: 79 MMHG | SYSTOLIC BLOOD PRESSURE: 139 MMHG | WEIGHT: 173.06 LBS

## 2017-09-15 VITALS
WEIGHT: 174 LBS | BODY MASS INDEX: 32.85 KG/M2 | HEART RATE: 57 BPM | SYSTOLIC BLOOD PRESSURE: 150 MMHG | DIASTOLIC BLOOD PRESSURE: 82 MMHG | HEIGHT: 61 IN

## 2017-09-15 DIAGNOSIS — I50.20 CHF (CONGESTIVE HEART FAILURE), NYHA CLASS III, UNSPECIFIED FAILURE CHRONICITY, SYSTOLIC: ICD-10-CM

## 2017-09-15 DIAGNOSIS — I50.20 CHF (CONGESTIVE HEART FAILURE), NYHA CLASS I, UNSPECIFIED FAILURE CHRONICITY, SYSTOLIC: Primary | ICD-10-CM

## 2017-09-15 DIAGNOSIS — I50.9 CONGESTIVE HEART FAILURE, UNSPECIFIED CONGESTIVE HEART FAILURE CHRONICITY, UNSPECIFIED CONGESTIVE HEART FAILURE TYPE: ICD-10-CM

## 2017-09-15 DIAGNOSIS — I42.9 CARDIOMYOPATHY, UNSPECIFIED TYPE: ICD-10-CM

## 2017-09-15 LAB
DIASTOLIC DYSFUNCTION: YES
ESTIMATED PA SYSTOLIC PRESSURE: 21.84
MITRAL VALVE REGURGITATION: ABNORMAL
RETIRED EF AND QEF - SEE NOTES: 25 (ref 55–65)
TRICUSPID VALVE REGURGITATION: ABNORMAL

## 2017-09-15 PROCEDURE — 3008F BODY MASS INDEX DOCD: CPT | Mod: NTX,,, | Performed by: INTERNAL MEDICINE

## 2017-09-15 PROCEDURE — 99999 PR PBB SHADOW E&M-EST. PATIENT-LVL III: CPT | Mod: PBBFAC,TXP,, | Performed by: INTERNAL MEDICINE

## 2017-09-15 PROCEDURE — 99215 OFFICE O/P EST HI 40 MIN: CPT | Mod: S$PBB,NTX,, | Performed by: INTERNAL MEDICINE

## 2017-09-15 PROCEDURE — 99213 OFFICE O/P EST LOW 20 MIN: CPT | Mod: PBBFAC,TXP | Performed by: INTERNAL MEDICINE

## 2017-09-15 PROCEDURE — 93010 ELECTROCARDIOGRAM REPORT: CPT | Mod: S$PBB,NTX,, | Performed by: INTERNAL MEDICINE

## 2017-09-15 PROCEDURE — 93306 TTE W/DOPPLER COMPLETE: CPT | Mod: 26,S$PBB,NTX, | Performed by: INTERNAL MEDICINE

## 2017-09-15 PROCEDURE — 99214 OFFICE O/P EST MOD 30 MIN: CPT | Mod: S$PBB,NTX,, | Performed by: INTERNAL MEDICINE

## 2017-09-15 PROCEDURE — 99213 OFFICE O/P EST LOW 20 MIN: CPT | Mod: PBBFAC,27,TXP | Performed by: INTERNAL MEDICINE

## 2017-09-15 PROCEDURE — C8929 TTE W OR WO FOL WCON,DOPPLER: HCPCS | Mod: PBBFAC,NTX | Performed by: INTERNAL MEDICINE

## 2017-09-15 PROCEDURE — 93005 ELECTROCARDIOGRAM TRACING: CPT | Mod: PBBFAC,NTX | Performed by: INTERNAL MEDICINE

## 2017-09-15 NOTE — LETTER
September 18, 2017        Kendall Donnelly  [address]  [place] [address]  Phone: [phone]  Fax: [phone]             [facility]  [address]  [facility] [address]  Phone: [phone]   Patient: Cj Silva   MR Number: [id number]   YOB: 1985   Date of Visit: 9/15/2017       Dear Dr. Kendall Donnelly    Thank you for referring Cj Silva to me for evaluation. Attached you will find relevant portions of my assessment and plan of care.    If you have questions, please do not hesitate to call me. I look forward to following Cj Silva along with you.    Sincerely,    Terra Euceda MD    Enclosure    If you would like to receive this communication electronically, please contact [email or web address] or [phone] to request [employer] access.    [employer] is a tool which provides read-only access to select patient information with whom you have a relationship. Its easy to use and provides real time access to review your patients record including encounter summaries, notes, results, and demographic information.    If you feel you have received this communication in error or would no longer like to receive these types of communications, please e-mail [email or web address]

## 2017-09-15 NOTE — PROGRESS NOTES
Subjective:    Patient ID:  Cj Sliva is a 31 y.o. female who presents for evaluation of Cardiomyopathy      HPI   31 y.o. F  HTN  peripartum CM x 6 mos (dx'd 6 mos post delivery of her most recent child [she has 4 kids]).    Now has NYHA II-III sx.  LH on occasion, but no syncope.  Echo today is pending. LVEF was 20-25% in 3/2017.    brother suffered SCD at age 33.  CPX: HR to 162 bpm. No ischemic changes on ECG.    My interpretation of today's ECG is NSR. Narrow QRSd.    Review of Systems   Constitution: Negative. Negative for weakness and malaise/fatigue.   HENT: Negative.  Negative for ear pain and tinnitus.    Eyes: Negative for blurred vision.   Cardiovascular: Positive for dyspnea on exertion. Negative for chest pain, near-syncope, palpitations and syncope.   Respiratory: Positive for shortness of breath.    Endocrine: Negative.  Negative for polyuria.   Hematologic/Lymphatic: Does not bruise/bleed easily.   Skin: Negative.  Negative for rash.   Musculoskeletal: Negative.  Negative for joint pain and muscle weakness.   Gastrointestinal: Negative.  Negative for abdominal pain and change in bowel habit.   Genitourinary: Negative for frequency.   Neurological: Negative.  Negative for dizziness.   Psychiatric/Behavioral: Negative.  Negative for depression. The patient is not nervous/anxious.    Allergic/Immunologic: Negative for environmental allergies.        Objective:    Physical Exam   Constitutional: She is oriented to person, place, and time. Vital signs are normal. She appears well-developed and well-nourished. She is active and cooperative.   HENT:   Head: Normocephalic and atraumatic.   Eyes: Conjunctivae and EOM are normal.   Neck: Normal range of motion. Carotid bruit is not present. No tracheal deviation and no edema present. No thyroid mass and no thyromegaly present.   Cardiovascular: Normal rate, regular rhythm, normal heart sounds, intact distal pulses and normal pulses.   No  extrasystoles are present. PMI is not displaced.  Exam reveals no gallop and no friction rub.    No murmur heard.  Pulmonary/Chest: Effort normal and breath sounds normal. No respiratory distress. She has no wheezes. She has no rales.   Abdominal: Soft. Normal appearance. She exhibits no distension. There is no hepatosplenomegaly.   Musculoskeletal: Normal range of motion.   Neurological: She is alert and oriented to person, place, and time. Coordination normal.   Skin: Skin is warm and dry. No rash noted.   Psychiatric: She has a normal mood and affect. Her speech is normal and behavior is normal. Thought content normal. Cognition and memory are normal.   Nursing note and vitals reviewed.        Assessment:       1. Peripartum cardiomyopathy    2. CHF (congestive heart failure), NYHA class III, unspecified failure chronicity, systolic         Plan:       Persistent NICM; likely peripartum.  Echo pending today. If LVEF remains <=35%, would make criteria for primary prophylaxis ICD.    I spent about a half hour discussing the risks and benefits of subcutaneous ICD placement and testing. Our discussion of risks included (but was not limited to) the possibility of infection, death, stroke, medication reaction, MI, and bleeding.  I discussed with patient risks, indications, benefits, and alternatives of the planned procedure. All questions were answered. Patient understands and wishes to proceed.  Discussed difference b/w SICD and TV-ICD.  She agrees that SICD is the way to go here.

## 2017-09-15 NOTE — PROGRESS NOTES
Subjective:   Initial evaluation of heart transplant candidacy.    HPI:  Ms. Silva is a 31 y.o. year old Black or  female who has presents to be considered for advanced surgical options (LVAD/OHT). 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 Patient has progressively improved significantly since I first met her. She continues to say she has been doing well, denies any cardiopulmonary symptoms at all. Denies any N/V/F/C, lightheadedness, dizziness, PND, orthopnea, LE edema, abdominal pain, abdominal pressure, chest pain, chest pressure. Home blood pressures little lower than here, but still around 120. NYHA FC I.     TTE 09/15/17:    1 - Mild to moderate left ventricular enlargement.     2 - Severely depressed left ventricular systolic function (EF 25-30%).     3 - Normal right ventricular systolic function .     4 - Impaired LV relaxation, normal LAP (grade 1 diastolic dysfunction).     5 - Mild left atrial enlargement.     6 - The estimated PA systolic pressure is 22 mmHg.     CPX 06/19/17:  1)  Resting spirometry reveals an FVC = 2.2L which is 69% of predicted, an FEV1 of 1.9L, which is 66% of predicted and an FEV1/FVC ratio of 83%. The MVV = 74 L/min, which is 72% of predicted.  2) The respiratory exchange ratio (RER) was 1.19, suggesting a good effort.  3) The breathing reserve is calculated at 21%, which is borderline reduced. Oxygen saturation with exercise remained normal. Due to the absence of symptoms of dyspnea, this reduction in breathing reserve may be due to a sub-clinical ventilatory defect versus a falsely reduced baseline MVV   4) The PkVO2 was 20.1 ml/kg/min which is 54% of predicted equating to a functional capacity of 5.7 METS indicating moderate to severe functional impairment.   5) The anaerobic threshold (AT), which  "occurred at a heart rate of 137bpm, was 14.4 ml/kg/min, which is 39% of the predicted VO2 and is reduced.   6) The PkVO2 Lean was 26.73 ml/kg of lean body weight/min indicating a good prognosis in heart failure.   7) The VE/VCO2 decreased by -43% from rest to AT. The VE/VCO2 [place] was 25.5.  The Resting PetCO2 was 35.8.      Review of Systems   Constitution: Positive for weight gain. Negative for chills, decreased appetite, diaphoresis, fever, weakness, malaise/fatigue and weight loss.   Eyes: Negative for visual disturbance.   Cardiovascular: Negative for chest pain, cyanosis, dyspnea on exertion, irregular heartbeat, leg swelling, near-syncope, orthopnea (down to 1 pillow at night, no PND), palpitations, paroxysmal nocturnal dyspnea and syncope.   Respiratory: Negative for cough, shortness of breath, sleep disturbances due to breathing, snoring, sputum production and wheezing.    Hematologic/Lymphatic: Negative for adenopathy and bleeding problem. Does not bruise/bleed easily.   Skin: Negative for color change, poor wound healing, rash, skin cancer and suspicious lesions.   Musculoskeletal: Negative for back pain, falls, gout, joint pain and muscle weakness.   Gastrointestinal: Negative for bloating, abdominal pain, anorexia, constipation, diarrhea, heartburn, hematemesis, hematochezia, melena, nausea and vomiting.   Genitourinary: Negative for nocturia and urgency.   Neurological: Negative for excessive daytime sleepiness, dizziness, focal weakness, headaches, light-headedness, paresthesias and tremors.   Psychiatric/Behavioral: Negative for depression and memory loss. The patient does not have insomnia and is not nervous/anxious.        Objective:   Blood pressure 139/79, pulse 64, height 5' 1" (1.549 m), weight 78.5 kg (173 lb 1 oz).body mass index is 32.7 kg/m².    Physical Exam   Constitutional: She is oriented to person, place, and time. She appears well-developed and well-nourished. No distress.   HENT: "   Head: Normocephalic and atraumatic.   Right Ear: External ear normal.   Left Ear: External ear normal.   Nose: Nose normal.   Mouth/Throat: Oropharynx is clear and moist. No oropharyngeal exudate.   Eyes: Pupils are equal, round, and reactive to light. No scleral icterus.   Neck: Trachea normal and normal range of motion. Neck supple. JVD (6cm H2O) present. No hepatojugular reflux present.   Cardiovascular: Normal rate, regular rhythm, S1 normal, S2 normal, normal heart sounds, intact distal pulses and normal pulses.  PMI is not displaced.  Exam reveals no gallop.    Pulmonary/Chest: Effort normal and breath sounds normal. She has no wheezes. She has no rhonchi. She has no rales.   Abdominal: Soft. Normal appearance and bowel sounds are normal. She exhibits no distension, no ascites and no mass. There is no tenderness.   Musculoskeletal: She exhibits no edema.   Lymphadenopathy:     She has no cervical adenopathy.   Neurological: She is alert and oriented to person, place, and time. Gait normal.   Skin: Skin is warm, dry and intact. She is not diaphoretic. No cyanosis. Nails show no clubbing.   Psychiatric: She has a normal mood and affect. Her speech is normal.   Nursing note and vitals reviewed.    Labs:      Chemistry        Component Value Date/Time     09/15/2017 1214    K 3.6 09/15/2017 1214     09/15/2017 1214    CO2 26 09/15/2017 1214    BUN 10 09/15/2017 1214    CREATININE 0.8 09/15/2017 1214    GLU 97 09/15/2017 1214        Component Value Date/Time    CALCIUM 9.3 09/15/2017 1214    ALKPHOS 97 09/15/2017 1214    AST 14 09/15/2017 1214    ALT 10 09/15/2017 1214    BILITOT 0.2 09/15/2017 1214          Lab Results   Component Value Date    WBC 5.16 02/17/2017    HGB 13.2 02/17/2017    HCT 42.5 02/17/2017    MCV 84 02/17/2017     02/17/2017     BNP   Date Value Ref Range Status   09/15/2017 57 0 - 99 pg/mL Final     Comment:     Values of less than 100 pg/ml are consistent with non-CHF  populations.   06/19/2017 48 0 - 99 pg/mL Final     Comment:     Values of less than 100 pg/ml are consistent with non-CHF populations.   02/17/2017 163 (H) 0 - 99 pg/mL Final     Comment:     Values of less than 100 pg/ml are consistent with non-CHF populations.     Labs were reviewed with the patient.    Assessment:      1. CHF (congestive heart failure), NYHA class I, unspecified failure chronicity, systolic    2. Peripartum cardiomyopathy        Plan:   Increase enresto dose to 2 tablets BID.   Repeat labs in 1 week close to home.   Plan is for patient to have ICD if LVEF is still below 35% with Dr. Serrano.  Patient is now NYHA I ACC stage D  Recommend 2 gram sodium restriction and 2000cc fluid restriction.  Encourage physical activity with graded exercise program.  Requested patient to weigh themselves daily, and to notify us if their weight increases by more than 3 lbs in 1 day or 5 lbs in 1 week.     Terra Euceda MD

## 2017-09-15 NOTE — PROGRESS NOTES
Patient identified via spelling of name and date of birth. Patient denies blood transfusion reaction. Consent obtained for use of contrast. Optison 3ml IVP voralicia Castillo.. 22 gauge saline lock started in right ac under aseptic technique. Optison 3ml IVP used as contrast for 2 d imaging. Saline lock d/emily and pressure dressing applied. Tolerated procedure well.

## 2017-09-18 ENCOUNTER — PATIENT MESSAGE (OUTPATIENT)
Dept: ELECTROPHYSIOLOGY | Facility: CLINIC | Age: 32
End: 2017-09-18

## 2017-09-18 DIAGNOSIS — I42.9 CARDIOMYOPATHY, UNSPECIFIED TYPE: Primary | ICD-10-CM

## 2017-09-18 NOTE — PROGRESS NOTES
Post-Procedure Patient Discharge Instructions  Pacemaker/Defibrillator  Wound Care   If you are discharged with a standard dressing over the incision, you may remove the dressing after 24 hours.    If you are discharged with an AQUACEL dressing, you should keep it on until your follow-up appointment in 1-2 weeks.   If there are Steri-strips (strips of tape) over your incision, leave them on until your follow-up appointment. They may begin to fall off on their own, which is normal. If there is Dermabond (clear glue) over your incision, do not scrub it off. It acts as a barrier and will eventually disappear.   You will be discharged with 5 days of oral antibiotics. Please take the full prescription until it is gone.   Do not get the incision wet for 48 hours following the procedure. You may sponge bath during this period, working around the incision. After 48 hours, you may shower, but you should still try to keep this area as dry as possible, and avoid direct water contact to the incision (allow the water to hit back of your shoulder rather than directly on the incision). Gently pat the incision dry if it does get wet.   You may take regular showers after 2 weeks, unless otherwise indicated at your follow-up visit.   Do not submerge the incision in a tub, pool, or body of water for 6 weeks.   Avoid using deodorants, powders, creams, lotions, etc. on your incision for 6 weeks.   If you notice unusual swelling, redness, drainage, have more device site pain, chest pain, shortness of breath, or have a fever greater than 100 degrees, call our device clinic immediately: [phone] or [phone] during normal office hours. You may call [phone] after-hours or on weekends and ask for the electrophysiologist on call.  Activity    If you only had a battery/generator change performed, there are no postoperative activity restrictions.    If this is your first device or if you had new wires added to your  existing device, then you will be discharged in a sling which you should wear continuously for 48 hours. After that, the sling should remain off during the day but should be worn at night for another 2-4 weeks (depending on how active a sleeper you are).   Do not raise your device-side arm above your shoulder for 6 weeks. Do not lift more than 5-10 lbs with your device-side arm for 6 weeks.    If you were driving prior to the procedure, you may resume driving after your first follow-up appointment (1-2 weeks). If you have a history of passing out or a history of certain arrhythmias, there may be driving restrictions unrelated to the procedure. Please clarify with your physician if this is the case.   No heavy activity with the affected arm for 6 weeks (eg. tennis, golf, bowling, aerobics, mowing the lawn, etc.).   Avoid rough contact at the device site for 6 weeks.   You may participate in sexual activity unless otherwise instructed.   You may return to work within 3-5 days unless told otherwise, provided you adhere to the above activity restrictions.  Long-Term Instructions   Keep your pacemaker or defibrillator identification card with you at all times.   If you have a defibrillator and you get shocked by the device: If you receive one shock and you feel ok, you may call [phone] or [phone] during normal office hours. You may call [phone] after-hours. If you receive one shock and you do not feel well, call Emergency Medical Services. They will take you to the nearest emergency room.   If you have a defibrillator and you get more than one shock from the device or multiple shocks in a short period of time: Call Emergency Medical Services. They will take you to the nearest emergency room.   Appliances: You may operate any electrical device in your home, including microwaves.   Security Systems: Electromagnetic security systems can be located in the workplace, courthouses, or other  high-security areas. Exposure to this type of security system has been shown to cause interference in some cases. Interference may be related to the duration of exposure and/or the distance between the device and the security device. You should be aware of the location of security systems, move through them at a normal pace, and avoid leaning or standing too close.   Metal Detectors at Airports: Metal detectors at airports can potentially interfere with pacemakers or defibrillators, although this is unlikely. Metal detectors will likely be triggered by your device and therefore at places such as airports  it will be important for you to carry your identification card for the pacemaker/defibrillator. Airport personnel will likely prefer to do a manual search.   Cellular Phones: It is unlikely that using a cellular phone will interfere with your device. It should be used with the hand opposite to the side where your device was implanted. The phone should not be carried in the shirt pocket on the same side as the device.   Specific Work Conditions: Patients who work near high-voltage lines, transmitting towers, large motors, welding equipment, or powerful magnets should discuss their specific situation with their physician. In general, remain at least two feet from external electrical equipment, verify that the equipment is properly grounded, and wear insulated gloves when using electrical devices. Leave the immediate location if lightheadedness or other symptoms develop.   MRI: Some pacemakers and defibrillators are safe in MRI scanners, while others are not. Please consult with your physician to see if you have an MRI-compatible device.   Surgery: Should you require surgery in the future, some electrosurgical devices can interfere with your device function. You should discuss this with your surgeon before any operation.   Radiation Therapy: If you ever require radiation therapy in the future, care must be taken  to avoid irradiating the device.  Long-Term Follow-Up   Your device has the ability to transmit device information from home to the doctors office using a home monitoring system.   This remote system takes the place of a doctors visit. Your device will be checked from home every 3-6 months. Every 6-12 months, you will be asked to come into the office for an in-office check.   Your device should last in the range of 6-12 years. This depends on many factors including how often it paces the heart.   When the battery is low, a generator change will be performed. This is a same-day procedure with no post-op activity restrictions, unless one of the pacemaker or defibrillator leads needs to be replaced at that time, or a new lead is added to your existing system.

## 2017-09-18 NOTE — PROGRESS NOTES
IMPLANTABLE DEVICE EDUCATION CHECKLIST    10-12-17 -Labs  PRE - PROCEDURE LABS HAVE BEEN ORDERED FOR YOU @ Dr. Pinto  (YOU DO NOT HAVE TO FAST FOR THIS LABWORK!!!!)    10-16-17 @ 8 AM  REPORT TO CARDIOLOGY WAITING ROOM ON 3RD FLOOR OF HOSPITAL (DO NOT REPORT TO CLINIC)  Directions for Reporting to Cardiology Waiting Area in the Hospital  If you park in the Parking Garage:  Take elevators to the 2nd floor  Walk up ramp and turn right by Gold Elevators  Take elevator to the 3rd floor  Upon exiting the elevator, turn away from the clinic areas  Walk long dangelo around to front of hospital to area with windows overlooking [facility]  Check in at Reception Desk  OR  If family is dropping you off:  Have them drop you off at the front of the Hospital  (Near the ER, where all the flags are hung).  Take the E elevators to the 3rd floor.  Check in at the Reception Desk in the waiting room.    WASH YOUR CHEST WITH HIBICLENS OR AN ANTIBACTERIAL SOAP (SUCH AS DIAL) ON THE NIGHT BEFORE AND THE MORNING OF YOUR PROCEDURE.    DO NOT EAT OR DRINK ANYTHING AFTER: 12 Midnight ON THE NIGHT BEFORE YOUR PROCEDURE    MEDICATIONS:  HOLD your furosemide (Lasix) and spironolactone (Aldactone) on the morning of your procedure.  YOU MAY TAKE OTHER USUAL MORNING MEDICATIONS WITH A SIP OF WATER.    YOU WILL BE SPENDING THE NIGHT AFTER YOUR PROCEDURE  YOU WILL NEED SOMEONE TO DRIVE YOU HOME THE DAY AFTER YOUR PROCEDURE    YOUR PAIN DURING YOUR PROCEDURE WILL BE MANAGED BY THE ANESTHESIA TEAM    THE ABOVE INSTRUCTIONS WERE GIVEN TO THE PATIENT VERBALLY AND THEY VERBALIZED UNDERSTANDING. THEY DO NOT REQUIRE ANY SPECIAL NEEDS AND DO NOT HAVE ANY LEARNING BARRIERS.     Any need to reschedule or cancel procedures, or any questions regarding your procedures should be addressed directly with the Arrhythmia Department Nurses at the following phone number: [phone]

## 2017-09-21 ENCOUNTER — TELEPHONE (OUTPATIENT)
Dept: ELECTROPHYSIOLOGY | Facility: CLINIC | Age: 32
End: 2017-09-21

## 2017-09-21 NOTE — TELEPHONE ENCOUNTER
Returned Pt's call. Pt thought we had called to discuss blood work with her. Advised we didn't call. Explained I had faxed orders to Dr Pinto for blood work to be done prior to her procedure which would be on 10/12/17. Pt voiced understanding.

## 2017-09-21 NOTE — TELEPHONE ENCOUNTER
----- Message from Teresita Thompson sent at 9/21/2017  3:32 PM CDT -----  Contact: Pt called  Pt called, states she is returning your call in regards to blood work and an appointment. Ph for pt is [phone]. Thank you

## 2017-09-25 ENCOUNTER — TELEPHONE (OUTPATIENT)
Dept: TRANSPLANT | Facility: CLINIC | Age: 32
End: 2017-09-25

## 2017-09-25 NOTE — TELEPHONE ENCOUNTER
9/22/17  0954 am:  Faxed lab orders for BMP and BNP to Dr. Pinto for 9/21/17 nurse lab phone review  9/25/17 0955 am:  Have received lab restuls: CMP and BNP.  BNP stable @ 22   Cr:  0.7   K+: 4.5   Other values WNL. Called pt to review results and confirm dose of Entresto she is taking ( see Dr. Euceda's recent clinic note) and obtain HF assessment. NA- LVM at mobile number. 1000 am: Called home number, male answered and said she is not with him.   9/25/17  1:40 pm:  Pt returned my call . Informed pt of lab results. Asked pt how she is currently taking Entresto in order to confirm dose and possibly send new Rx to her pharmacy. Pt told me she is taking Entresto 24/26 (1) tablet twice daily.  NOT (2) tablets twice daily as Dr. Euceda recently instructed pt to do ( and this was the reason for lab work at this time). Apparently, pt mis understood this dose change. Starting tomorrow, 9/26/17, pt will start taking (2) Entresto 24/26 tablets twice daily. Pt will have repeat lab work on Monday, October 2nd locally as she did this time.   9/26/17 4:15 pm:  Discussed all of above with Dr. Euceda. She signed lab orders for BMP and BNP to be done 10/2/17. @ 4:55 pm I faxed this lab order and confirmed receipt of it with Mitra of Dr. Pinto's office. Nurse lab phone review entered for 10/2/17.

## 2017-10-04 ENCOUNTER — TELEPHONE (OUTPATIENT)
Dept: TRANSPLANT | Facility: CLINIC | Age: 32
End: 2017-10-04

## 2017-10-06 ENCOUNTER — TELEPHONE (OUTPATIENT)
Dept: TRANSPLANT | Facility: CLINIC | Age: 32
End: 2017-10-06

## 2017-10-06 NOTE — TELEPHONE ENCOUNTER
10/3 I spoke to the pt and she informed that she's going get her labs drawn for 1 today. I called pt around 130 to see if she had made to the lab, she stated that she is at the lab right now.    10/4 Called lab to get pt lab result  and I was informed that pt didn't show up for her labs. I called pt more they once and didn't get an answer

## 2017-10-11 ENCOUNTER — TELEPHONE (OUTPATIENT)
Dept: TRANSPLANT | Facility: CLINIC | Age: 32
End: 2017-10-11

## 2017-10-11 NOTE — TELEPHONE ENCOUNTER
1140 am:  F/U again with pt regarding blood work post Entresto dose increase. See Kulwant BUCIO notes regarding several attempts to discuss pt having blood work done.   Called and spoke with pt at this time. She told me she increased the Entresto dose to (2) tablets twice daily on 9/29/17. Stated she sees Dr. Pinto tomorrow and will have the lab work done then ( this is the lab work I had sent orders to Dr. Pinto to be done 10/2/17).  Informed pt again , of why it is very important to have this lab work done. Informed her of entresto and kidney and electrolyte effects. Pt voice understanding and plans to go tomorrow. I will follow for results.

## 2017-10-12 ENCOUNTER — TELEPHONE (OUTPATIENT)
Dept: TRANSPLANT | Facility: CLINIC | Age: 32
End: 2017-10-12

## 2017-10-12 NOTE — TELEPHONE ENCOUNTER
3:25 pm:  Received a call from Qing HOWARD With Dr. Pinto stating pt is there for blood work and asking what blood work we wanted  I told her a BMP and a BNP-said they will draw today and have results faxed to us tomorrow. Provided her with our office fax #: [phone].  She did not know where orders I had faxed on 9/26/17 for this lab work which was actually scheduled to be done 10/2/17.

## 2017-10-13 ENCOUNTER — TELEPHONE (OUTPATIENT)
Dept: ELECTROPHYSIOLOGY | Facility: CLINIC | Age: 32
End: 2017-10-13

## 2017-10-13 DIAGNOSIS — I50.22 CHRONIC SYSTOLIC HEART FAILURE: Primary | ICD-10-CM

## 2017-10-13 NOTE — TELEPHONE ENCOUNTER
Called Pt to confirm procedure on Monday. Reviewed instructions. Pt voiced understanding and denied any further questions, needs or concerns.

## 2017-10-13 NOTE — TELEPHONE ENCOUNTER
1040 am:  Received BMP and BNP results today from collection date 10/12/17 ( see previous notes regarding Entresto dose increase and labs ordered and not done).  BNP:  25    BMP:    Cr: 0.7     K+: 4.0   Called pt and reviewed lab results with her. Again, asked pt how she is taking Entresto and asked her to read the bottle prescription to me. She read: Entresto 24-26 (2) tablets twice daily. See Dr. Euceda's 9/15/17 note.  Pt now running low on this prescription. Labs stable and pt doing well. Will send Rx for Entresto 49/51 to [facility] pharmacy ( as I verified with pt as in her provile). Explained to pt a couple of time this is a different dose ( double dose on prescription from what she currently has been taking). Told pt this dose is 49/51 therefore she with take ONE tablet twice daily.  Pt voiced understanding that once she fills this new prescription to take (1) tablet NOT (2) tablets twice daily.  Dr. Euceda not in the office today-so will call in prescription.   1100 am:  Entresto 49/51 (1) tablet twice daily # 30 with 5 refills called to pharmacist Ryan @ [facility] in [place], [place]. And entered into pts chart for verification and electronic signature by Dr. Euceda.

## 2017-10-17 ENCOUNTER — PATIENT MESSAGE (OUTPATIENT)
Dept: ELECTROPHYSIOLOGY | Facility: CLINIC | Age: 32
End: 2017-10-17

## 2017-10-17 NOTE — PROGRESS NOTES
IMPLANTABLE DEVICE EDUCATION CHECKLIST     11-13-17 -Labs  PRE - PROCEDURE LABS HAVE BEEN ORDERED FOR YOU @ Dr. Pinto  (YOU DO NOT HAVE TO FAST FOR THIS LABWORK!!!!)     11-16-17 @ 8 AM  REPORT TO CARDIOLOGY WAITING ROOM ON 3RD FLOOR OF HOSPITAL (DO NOT REPORT TO CLINIC)  Directions for Reporting to Cardiology Waiting Area in the Hospital  If you park in the Parking Garage:  Take elevators to the 2nd floor  Walk up ramp and turn right by Gold Elevators  Take elevator to the 3rd floor  Upon exiting the elevator, turn away from the clinic areas  Walk long dangelo around to front of hospital to area with windows overlooking [facility]  Check in at Reception Desk  OR  If family is dropping you off:  Have them drop you off at the front of the Hospital  (Near the ER, where all the flags are hung).  Take the E elevators to the 3rd floor.  Check in at the Reception Desk in the waiting room.     WASH YOUR CHEST WITH HIBICLENS OR AN ANTIBACTERIAL SOAP (SUCH AS DIAL) ON THE NIGHT BEFORE AND THE MORNING OF YOUR PROCEDURE.     DO NOT EAT OR DRINK ANYTHING AFTER: 12 Midnight ON THE NIGHT BEFORE YOUR PROCEDURE     MEDICATIONS:  HOLD your furosemide (Lasix) and spironolactone (Aldactone) on the morning of your procedure.  YOU MAY TAKE OTHER USUAL MORNING MEDICATIONS WITH A SIP OF WATER.     YOU WILL BE SPENDING THE NIGHT AFTER YOUR PROCEDURE  YOU WILL NEED SOMEONE TO DRIVE YOU HOME THE DAY AFTER YOUR PROCEDURE     YOUR PAIN DURING YOUR PROCEDURE WILL BE MANAGED BY THE ANESTHESIA TEAM     THE ABOVE INSTRUCTIONS WERE GIVEN TO THE PATIENT VERBALLY AND THEY VERBALIZED UNDERSTANDING. THEY DO NOT REQUIRE ANY SPECIAL NEEDS AND DO NOT HAVE ANY LEARNING BARRIERS.      Any need to reschedule or cancel procedures, or any questions regarding your procedures should be addressed directly with the Arrhythmia Department Nurses at the following phone number: [phone]

## 2017-11-15 ENCOUNTER — TELEPHONE (OUTPATIENT)
Dept: ELECTROPHYSIOLOGY | Facility: CLINIC | Age: 32
End: 2017-11-15

## 2017-11-15 NOTE — TELEPHONE ENCOUNTER
Contacted Pt to confirm procedure for tomorrow. Reviewed instructions for procedure with Pt. Pt voiced understanding and denied any further questions needs or concerns.

## 2017-11-16 ENCOUNTER — ANESTHESIA EVENT (OUTPATIENT)
Dept: MEDSURG UNIT | Facility: HOSPITAL | Age: 32
End: 2017-11-16
Payer: MEDICAID

## 2017-11-16 ENCOUNTER — ANESTHESIA (OUTPATIENT)
Dept: MEDSURG UNIT | Facility: HOSPITAL | Age: 32
End: 2017-11-16
Payer: MEDICAID

## 2017-11-16 ENCOUNTER — HOSPITAL ENCOUNTER (OUTPATIENT)
Facility: HOSPITAL | Age: 32
Discharge: HOME | End: 2017-11-17
Attending: INTERNAL MEDICINE | Admitting: INTERNAL MEDICINE
Payer: MEDICAID

## 2017-11-16 ENCOUNTER — SURGERY (OUTPATIENT)
Age: 32
End: 2017-11-16

## 2017-11-16 DIAGNOSIS — I50.20 SYSTOLIC CONGESTIVE HEART FAILURE, NYHA CLASS 1, UNSPECIFIED CONGESTIVE HEART FAILURE CHRONICITY: Primary | ICD-10-CM

## 2017-11-16 DIAGNOSIS — I42.9 CARDIOMYOPATHY: ICD-10-CM

## 2017-11-16 DIAGNOSIS — I42.8 NON-ISCHEMIC CARDIOMYOPATHY: ICD-10-CM

## 2017-11-16 DIAGNOSIS — I50.30 CHF WITH LEFT VENTRICULAR DIASTOLIC DYSFUNCTION, NYHA CLASS 2: ICD-10-CM

## 2017-11-16 DIAGNOSIS — I49.9 ARRHYTHMIA: ICD-10-CM

## 2017-11-16 LAB
APTT BLDCRRT: 26.3 SEC
BASOPHILS # BLD AUTO: 0.03 K/UL
BASOPHILS NFR BLD: 0.5 %
DIFFERENTIAL METHOD: ABNORMAL
EOSINOPHIL # BLD AUTO: 0.1 K/UL
EOSINOPHIL NFR BLD: 1 %
ERYTHROCYTE [DISTWIDTH] IN BLOOD BY AUTOMATED COUNT: 12.6 %
HCT VFR BLD AUTO: 36.3 %
HGB BLD-MCNC: 11.8 G/DL
IMM GRANULOCYTES # BLD AUTO: 0.01 K/UL
IMM GRANULOCYTES NFR BLD AUTO: 0.2 %
INR PPP: 1
LYMPHOCYTES # BLD AUTO: 3.7 K/UL
LYMPHOCYTES NFR BLD: 58.7 %
MCH RBC QN AUTO: 27.2 PG
MCHC RBC AUTO-ENTMCNC: 32.5 G/DL
MCV RBC AUTO: 84 FL
MONOCYTES # BLD AUTO: 0.5 K/UL
MONOCYTES NFR BLD: 8.1 %
NEUTROPHILS # BLD AUTO: 2 K/UL
NEUTROPHILS NFR BLD: 31.5 %
NRBC BLD-RTO: 0 /100 WBC
PLATELET # BLD AUTO: 264 K/UL
PMV BLD AUTO: 11.3 FL
PROTHROMBIN TIME: 10.4 SEC
RBC # BLD AUTO: 4.34 M/UL
WBC # BLD AUTO: 6.27 K/UL

## 2017-11-16 PROCEDURE — 63600175 PHARM REV CODE 636 W HCPCS: Mod: TXP

## 2017-11-16 PROCEDURE — 63600175 PHARM REV CODE 636 W HCPCS: Mod: TXP | Performed by: NURSE ANESTHETIST, CERTIFIED REGISTERED

## 2017-11-16 PROCEDURE — 63600175 PHARM REV CODE 636 W HCPCS: Mod: TXP | Performed by: NURSE PRACTITIONER

## 2017-11-16 PROCEDURE — 36415 COLL VENOUS BLD VENIPUNCTURE: CPT | Mod: TXP

## 2017-11-16 PROCEDURE — D9220A PRA ANESTHESIA: Mod: CRNA,NTX,, | Performed by: NURSE ANESTHETIST, CERTIFIED REGISTERED

## 2017-11-16 PROCEDURE — 25000003 PHARM REV CODE 250: Mod: NTX | Performed by: NURSE ANESTHETIST, CERTIFIED REGISTERED

## 2017-11-16 PROCEDURE — 85610 PROTHROMBIN TIME: CPT | Mod: TXP

## 2017-11-16 PROCEDURE — 25000003 PHARM REV CODE 250: Mod: TXP | Performed by: NURSE PRACTITIONER

## 2017-11-16 PROCEDURE — C1883 ADAPT/EXT, PACING/NEURO LEAD: HCPCS | Mod: TXP

## 2017-11-16 PROCEDURE — 63600175 PHARM REV CODE 636 W HCPCS: Mod: TXP | Performed by: ANESTHESIOLOGY

## 2017-11-16 PROCEDURE — 37000008 HC ANESTHESIA 1ST 15 MINUTES: Mod: TXP | Performed by: INTERNAL MEDICINE

## 2017-11-16 PROCEDURE — 25000003 PHARM REV CODE 250: Mod: TXP | Performed by: NURSE ANESTHETIST, CERTIFIED REGISTERED

## 2017-11-16 PROCEDURE — 85730 THROMBOPLASTIN TIME PARTIAL: CPT | Mod: TXP

## 2017-11-16 PROCEDURE — 93005 ELECTROCARDIOGRAM TRACING: CPT | Mod: TXP

## 2017-11-16 PROCEDURE — D9220A PRA ANESTHESIA: Mod: ANES,NTX,, | Performed by: ANESTHESIOLOGY

## 2017-11-16 PROCEDURE — 33270 INS/REP SUBQ DEFIBRILLATOR: CPT | Mod: NTX,,, | Performed by: INTERNAL MEDICINE

## 2017-11-16 PROCEDURE — 33270 INS/REP SUBQ DEFIBRILLATOR: CPT | Mod: TXP

## 2017-11-16 PROCEDURE — 25000003 PHARM REV CODE 250: Mod: TXP

## 2017-11-16 PROCEDURE — 93010 ELECTROCARDIOGRAM REPORT: CPT | Mod: NTX,,, | Performed by: INTERNAL MEDICINE

## 2017-11-16 PROCEDURE — 37000009 HC ANESTHESIA EA ADD 15 MINS: Mod: TXP | Performed by: INTERNAL MEDICINE

## 2017-11-16 PROCEDURE — 85025 COMPLETE CBC W/AUTO DIFF WBC: CPT | Mod: TXP

## 2017-11-16 RX ORDER — SODIUM CHLORIDE 0.9 % (FLUSH) 0.9 %
3 SYRINGE (ML) INJECTION
Status: DISCONTINUED | OUTPATIENT
Start: 2017-11-16 | End: 2017-11-17 | Stop reason: HOSPADM

## 2017-11-16 RX ORDER — ETOMIDATE 2 MG/ML
INJECTION INTRAVENOUS
Status: DISCONTINUED | OUTPATIENT
Start: 2017-11-16 | End: 2017-11-16

## 2017-11-16 RX ORDER — ACETAMINOPHEN 325 MG/1
650 TABLET ORAL EVERY 6 HOURS PRN
Status: DISCONTINUED | OUTPATIENT
Start: 2017-11-16 | End: 2017-11-17 | Stop reason: HOSPADM

## 2017-11-16 RX ORDER — HYDROMORPHONE HYDROCHLORIDE 1 MG/ML
0.2 INJECTION, SOLUTION INTRAMUSCULAR; INTRAVENOUS; SUBCUTANEOUS EVERY 5 MIN PRN
Status: COMPLETED | OUTPATIENT
Start: 2017-11-16 | End: 2017-11-16

## 2017-11-16 RX ORDER — MIDAZOLAM HYDROCHLORIDE 1 MG/ML
INJECTION, SOLUTION INTRAMUSCULAR; INTRAVENOUS
Status: DISCONTINUED | OUTPATIENT
Start: 2017-11-16 | End: 2017-11-16

## 2017-11-16 RX ORDER — FUROSEMIDE 20 MG/1
20 TABLET ORAL EVERY OTHER DAY
Status: DISCONTINUED | OUTPATIENT
Start: 2017-11-17 | End: 2017-11-17 | Stop reason: HOSPADM

## 2017-11-16 RX ORDER — PHENYLEPHRINE HYDROCHLORIDE 10 MG/ML
INJECTION INTRAVENOUS
Status: DISCONTINUED | OUTPATIENT
Start: 2017-11-16 | End: 2017-11-16

## 2017-11-16 RX ORDER — POTASSIUM CHLORIDE 20 MEQ/1
20 TABLET, EXTENDED RELEASE ORAL EVERY OTHER DAY
Status: DISCONTINUED | OUTPATIENT
Start: 2017-11-17 | End: 2017-11-17 | Stop reason: HOSPADM

## 2017-11-16 RX ORDER — CARVEDILOL 25 MG/1
25 TABLET ORAL 2 TIMES DAILY WITH MEALS
Status: DISCONTINUED | OUTPATIENT
Start: 2017-11-16 | End: 2017-11-17 | Stop reason: HOSPADM

## 2017-11-16 RX ORDER — MIDAZOLAM HYDROCHLORIDE 1 MG/ML
INJECTION INTRAMUSCULAR; INTRAVENOUS
Status: DISCONTINUED | OUTPATIENT
Start: 2017-11-16 | End: 2017-11-16

## 2017-11-16 RX ORDER — KETAMINE HYDROCHLORIDE 100 MG/ML
INJECTION, SOLUTION INTRAMUSCULAR; INTRAVENOUS
Status: DISCONTINUED | OUTPATIENT
Start: 2017-11-16 | End: 2017-11-16

## 2017-11-16 RX ORDER — SODIUM CHLORIDE 9 MG/ML
INJECTION, SOLUTION INTRAVENOUS CONTINUOUS
Status: DISCONTINUED | OUTPATIENT
Start: 2017-11-16 | End: 2017-11-17 | Stop reason: HOSPADM

## 2017-11-16 RX ORDER — SODIUM CHLORIDE 9 MG/ML
INJECTION, SOLUTION INTRAVENOUS CONTINUOUS PRN
Status: DISCONTINUED | OUTPATIENT
Start: 2017-11-16 | End: 2017-11-16

## 2017-11-16 RX ORDER — NEOSTIGMINE METHYLSULFATE 1 MG/ML
INJECTION, SOLUTION INTRAVENOUS
Status: DISCONTINUED | OUTPATIENT
Start: 2017-11-16 | End: 2017-11-16

## 2017-11-16 RX ORDER — HYDRALAZINE HYDROCHLORIDE 20 MG/ML
5 INJECTION INTRAMUSCULAR; INTRAVENOUS EVERY 10 MIN PRN
Status: DISCONTINUED | OUTPATIENT
Start: 2017-11-16 | End: 2017-11-17 | Stop reason: HOSPADM

## 2017-11-16 RX ORDER — SPIRONOLACTONE 25 MG/1
25 TABLET ORAL DAILY
Status: DISCONTINUED | OUTPATIENT
Start: 2017-11-16 | End: 2017-11-17 | Stop reason: HOSPADM

## 2017-11-16 RX ORDER — ROCURONIUM BROMIDE 10 MG/ML
INJECTION, SOLUTION INTRAVENOUS
Status: DISCONTINUED | OUTPATIENT
Start: 2017-11-16 | End: 2017-11-16

## 2017-11-16 RX ORDER — CEFAZOLIN SODIUM 2 G/50ML
2 SOLUTION INTRAVENOUS
Status: COMPLETED | OUTPATIENT
Start: 2017-11-16 | End: 2017-11-17

## 2017-11-16 RX ORDER — GLYCOPYRROLATE 0.2 MG/ML
INJECTION INTRAMUSCULAR; INTRAVENOUS
Status: DISCONTINUED | OUTPATIENT
Start: 2017-11-16 | End: 2017-11-16

## 2017-11-16 RX ORDER — CEFAZOLIN SODIUM 2 G/50ML
2 SOLUTION INTRAVENOUS
Status: COMPLETED | OUTPATIENT
Start: 2017-11-16 | End: 2017-11-16

## 2017-11-16 RX ORDER — OXYCODONE AND ACETAMINOPHEN 5; 325 MG/1; MG/1
1 TABLET ORAL EVERY 6 HOURS PRN
Status: DISCONTINUED | OUTPATIENT
Start: 2017-11-16 | End: 2017-11-17 | Stop reason: HOSPADM

## 2017-11-16 RX ORDER — ONDANSETRON 2 MG/ML
INJECTION INTRAMUSCULAR; INTRAVENOUS
Status: DISCONTINUED | OUTPATIENT
Start: 2017-11-16 | End: 2017-11-16

## 2017-11-16 RX ORDER — LABETALOL HYDROCHLORIDE 5 MG/ML
10 INJECTION, SOLUTION INTRAVENOUS EVERY 10 MIN PRN
Status: DISCONTINUED | OUTPATIENT
Start: 2017-11-16 | End: 2017-11-17 | Stop reason: HOSPADM

## 2017-11-16 RX ORDER — LIDOCAINE HCL/PF 100 MG/5ML
SYRINGE (ML) INTRAVENOUS
Status: DISCONTINUED | OUTPATIENT
Start: 2017-11-16 | End: 2017-11-16

## 2017-11-16 RX ORDER — ONDANSETRON 2 MG/ML
4 INJECTION INTRAMUSCULAR; INTRAVENOUS DAILY PRN
Status: COMPLETED | OUTPATIENT
Start: 2017-11-16 | End: 2017-11-16

## 2017-11-16 RX ADMIN — ETOMIDATE 6 MG: 2 INJECTION, SOLUTION INTRAVENOUS at 11:11

## 2017-11-16 RX ADMIN — LIDOCAINE HYDROCHLORIDE 100 MG: 20 INJECTION, SOLUTION INTRAVENOUS at 11:11

## 2017-11-16 RX ADMIN — OXYCODONE HYDROCHLORIDE AND ACETAMINOPHEN 1 TABLET: 5; 325 TABLET ORAL at 03:11

## 2017-11-16 RX ADMIN — SODIUM CHLORIDE: 0.9 INJECTION, SOLUTION INTRAVENOUS at 11:11

## 2017-11-16 RX ADMIN — OXYCODONE HYDROCHLORIDE AND ACETAMINOPHEN 1 TABLET: 5; 325 TABLET ORAL at 09:11

## 2017-11-16 RX ADMIN — CARVEDILOL 25 MG: 25 TABLET, FILM COATED ORAL at 05:11

## 2017-11-16 RX ADMIN — ONDANSETRON 4 MG: 2 INJECTION INTRAMUSCULAR; INTRAVENOUS at 07:11

## 2017-11-16 RX ADMIN — HYDROMORPHONE HYDROCHLORIDE 0.2 MG: 1 INJECTION, SOLUTION INTRAMUSCULAR; INTRAVENOUS; SUBCUTANEOUS at 03:11

## 2017-11-16 RX ADMIN — HYDROMORPHONE HYDROCHLORIDE 0.2 MG: 1 INJECTION, SOLUTION INTRAMUSCULAR; INTRAVENOUS; SUBCUTANEOUS at 02:11

## 2017-11-16 RX ADMIN — ROCURONIUM BROMIDE 20 MG: 10 INJECTION, SOLUTION INTRAVENOUS at 12:11

## 2017-11-16 RX ADMIN — SPIRONOLACTONE 25 MG: 25 TABLET, FILM COATED ORAL at 05:11

## 2017-11-16 RX ADMIN — PHENYLEPHRINE HYDROCHLORIDE 50 MCG: 10 INJECTION INTRAVENOUS at 01:11

## 2017-11-16 RX ADMIN — MIDAZOLAM 2 MG: 1 INJECTION INTRAMUSCULAR; INTRAVENOUS at 11:11

## 2017-11-16 RX ADMIN — ONDANSETRON 4 MG: 2 INJECTION INTRAMUSCULAR; INTRAVENOUS at 03:11

## 2017-11-16 RX ADMIN — ROCURONIUM BROMIDE 50 MG: 10 INJECTION, SOLUTION INTRAVENOUS at 11:11

## 2017-11-16 RX ADMIN — HYDROMORPHONE HYDROCHLORIDE 0.2 MG: 1 INJECTION, SOLUTION INTRAMUSCULAR; INTRAVENOUS; SUBCUTANEOUS at 04:11

## 2017-11-16 RX ADMIN — ONDANSETRON 4 MG: 2 INJECTION INTRAMUSCULAR; INTRAVENOUS at 01:11

## 2017-11-16 RX ADMIN — CEFAZOLIN SODIUM 2 G: 2 SOLUTION INTRAVENOUS at 07:11

## 2017-11-16 RX ADMIN — ETOMIDATE 12 MG: 2 INJECTION, SOLUTION INTRAVENOUS at 11:11

## 2017-11-16 RX ADMIN — NEOSTIGMINE METHYLSULFATE 4 MG: 1 INJECTION INTRAVENOUS at 02:11

## 2017-11-16 RX ADMIN — GLYCOPYRROLATE 0.4 MG: 0.2 INJECTION, SOLUTION INTRAMUSCULAR; INTRAVENOUS at 02:11

## 2017-11-16 RX ADMIN — SACUBITRIL AND VALSARTAN 1 TABLET: 49; 51 TABLET, FILM COATED ORAL at 09:11

## 2017-11-16 RX ADMIN — CEFAZOLIN SODIUM 2 G: 2 SOLUTION INTRAVENOUS at 12:11

## 2017-11-16 RX ADMIN — KETAMINE HYDROCHLORIDE 20 MG: 100 INJECTION, SOLUTION, CONCENTRATE INTRAMUSCULAR; INTRAVENOUS at 01:11

## 2017-11-16 NOTE — ASSESSMENT & PLAN NOTE
SICD implantation   Sedation per anesthesia     Prior to procedure, extensive discussion with patient regarding risks and benefits of  SICD implantation , and patient  would like to proceed.  The patient voices understanding and all questions have been answered. No further questions/concerns voiced at this time.

## 2017-11-16 NOTE — ANESTHESIA PREPROCEDURE EVALUATION
11/16/2017  Cj Silva is a 31 y.o., female.    Anesthesia Evaluation    I have reviewed the Patient Summary Reports.    I have reviewed the Nursing Notes.   I have reviewed the Medications.     Review of Systems  Anesthesia Hx:  No problems with previous Anesthesia  Denies Family Hx of Anesthesia complications.   Denies Personal Hx of Anesthesia complications.   Cardiovascular:   Hypertension CHF NYHA Classification I CONCLUSIONS     1 - Mild to moderate left ventricular enlargement.     2 - Severely depressed left ventricular systolic function (EF 25-30%).     3 - Normal right ventricular systolic function .     4 - Impaired LV relaxation, normal LAP (grade 1 diastolic dysfunction).     5 - Mild left atrial enlargement.     6 - The estimated PA systolic pressure is 22 mmHg.    Endocrine:  Metabolic Disorders, Obesity / BMI > 30      Physical Exam   Airway/Jaw/Neck:  Airway Findings: Mouth Opening: Normal Tongue: Normal  General Airway Assessment: Adult, Good  TM Distance: Normal, at least 6 cm       Chest/Lungs:  Chest/Lungs Findings: Normal Respiratory Rate         Mental Status:  Mental Status Findings:  Cooperative, Alert and Oriented         Anesthesia Plan  Type of Anesthesia, risks & benefits discussed:  Anesthesia Type:  general  Patient's Preference: General  Intra-op Monitoring Plan: standard ASA monitors  Intra-op Monitoring Plan Comments:   Post Op Pain Control Plan:   Post Op Pain Control Plan Comments: Per primary service  Induction:   IV  Beta Blocker:  Patient is on a Beta-Blocker and has received one dose within the past 24 hours (No further documentation required).       Informed Consent: Patient understands risks and agrees with Anesthesia plan.  Questions answered. Anesthesia consent signed with patient.  ASA Score: 4     Day of Surgery Review of History & Physical:    H&P  update referred to the surgeon.         Ready For Surgery From Anesthesia Perspective.

## 2017-11-16 NOTE — TRANSFER OF CARE
"Anesthesia Transfer of Care Note    Patient: Cj Silva    Procedure(s) Performed: Procedure(s) (LRB):  INSERTION-ICD-SINGLE (N/A)    Patient location: PACU    Anesthesia Type: general    Transport from OR: Transported from OR on room air with adequate spontaneous ventilation    Post pain: adequate analgesia    Post assessment: no apparent anesthetic complications and tolerated procedure well    Post vital signs: stable    Level of consciousness: awake, alert and oriented    Nausea/Vomiting: no nausea/vomiting    Complications: none    Transfer of care protocol was followed      Last vitals:   Visit Vitals  BP (!) 163/96   Pulse (!) 59   Temp 36.4 °C (97.6 °F) (Axillary)   Resp 18   Ht 5' 1" (1.549 m)   Wt 79.4 kg (175 lb)   LMP 08/24/2017   SpO2 100%   Breastfeeding? No   BMI 33.07 kg/m²     "

## 2017-11-16 NOTE — PLAN OF CARE
Problem: Patient Care Overview  Goal: Plan of Care Review  Outcome: Ongoing (interventions implemented as appropriate)  Pt transported from recovery via stretcher.  Pt aao.  Oriented pt to rm and unit.  Post op orders and assessment initiated. Telemetry monitor applied.  Pt in no acute distress and verbalizes no complaints.  Will continue to monitor.  Call bell within reach.

## 2017-11-16 NOTE — SUBJECTIVE & OBJECTIVE
Past Medical History:   Diagnosis Date    Cardiomyopathy     CHF (congestive heart failure)     Hypertension        History reviewed. No pertinent surgical history.    Review of patient's allergies indicates:  No Known Allergies    No current facility-administered medications on file prior to encounter.      Current Outpatient Prescriptions on File Prior to Encounter   Medication Sig    carvedilol (COREG) 25 MG tablet Take 25 mg by mouth 2 (two) times daily with meals.    spironolactone (ALDACTONE) 25 MG tablet Take 25 mg by mouth once daily.    furosemide (LASIX) 20 MG tablet Take 20 mg by mouth 2 (two) times daily.    potassium chloride SA (K-DUR,KLOR-CON) 20 MEQ tablet Take 20 mEq by mouth once daily.     Family History     Problem Relation (Age of Onset)    Cancer Mother    Hypertension Mother, Father        Social History Main Topics    Smoking status: Never Smoker    Smokeless tobacco: Not on file    Alcohol use No    Drug use: No    Sexual activity: Yes     Partners: Male     ROS Review of Systems   Constitution: Negative for fevers/chills.   Positive for easily gets    weak and has malaise/fatigue.   HENT: Negative for ear pain and tinnitus.   Eyes: Negative for blurred vision.   Cardiovascular: . Negative for chest pain,  near-syncope, and syncope.   Respiratory:  Positive for shortness of breath on exertion   Endocrine:  Negative for polyuria.   Hematologic/Lymphatic: Negative for bruise/bleed easily.   Skin:  Negative for rash.   Musculoskeletal: Negative for joint pain and muscle weakness.   Extremity: Positive  For occasional  swelling in the lower extremities.   Gastrointestinal:  Negative for abdominal pain and change in bowel habit.   Genitourinary: Negative for frequency.   Neurological:  Positive  for occasional dizziness.   Psychiatric/Behavioral:  Negative for depression, anxiety           Objective:     Vital Signs (Most Recent):  Temp: 97.8 °F (36.6 °C) (11/16/17 0843)  Pulse: (!)  53 (11/16/17 0843)  Resp: 16 (11/16/17 0843)  BP: (!) 142/88 (11/16/17 0843) Vital Signs (24h Range):  Temp:  [97.8 °F (36.6 °C)] 97.8 °F (36.6 °C)  Pulse:  [53] 53  Resp:  [16] 16  BP: (142-143)/(84-88) 142/88     Weight: 79.4 kg (175 lb)  Body mass index is 33.07 kg/m².            Physical Exam       General: Well developed, well nourished, No distress on room air   HEENT: Head is normocephalic, atraumatic;  Lungs: Clear to auscultation bilaterally.  No wheezing. Normal respiratory effort.  Cardiovascular:  S1 S2 bradycardic, regular rhythm and normal heart sounds.    PMI is not displaced  Extremities: No LE edema.  Abdomen: Abdomen is soft, non-tender non-distended with normal bowel sounds.  Skin: Skin color, texture, turgor normal. No rashes.  Musculoskeletal: normal range of motion   Neurologic: Normal strength and tone. No focal numbness or weakness.   Psychiatric: normal mood and affect.  behavior is normal. Alert and oriented X 3.      Significant Labs: outside labs reviewed which were obtained at The medical office on 11/13/17 K 3.4, BUN/SCR 8/0.7, H/H 11.6/36.1 platelet 272. Repeat INR/CBC for today in progress.     Significant Imaging:   ECHO   CONCLUSIONS     1 - Mild to moderate left ventricular enlargement.     2 - Severely depressed left ventricular systolic function (EF 25-30%).     3 - Normal right ventricular systolic function .     4 - Impaired LV relaxation, normal LAP (grade 1 diastolic dysfunction).     5 - Mild left atrial enlargement.     6 - The estimated PA systolic pressure is 22 mmHg.     This document has been electronically    SIGNED BY: Fermin Castillo MD On: 09/15/2017 15:08

## 2017-11-16 NOTE — NURSING
Ambulated on unit with boyfriend at side.  Verbalized understanding of procedure.  No c/o pain or sob.  Waiting for pt to void so upt can be collected.  Will continue to monitor.

## 2017-11-16 NOTE — H&P
[facility]  Cardiac Electrophysiology  History and Physical     Admission Date: 11/16/2017  Code Status: No Order   Attending Provider: Christian Serrano MD   Principal Problem:Non-ischemic cardiomyopathy    Subjective:     Chief Complaint:  NICM     HPI:  31 year old female with PMH HTN, peripartum CM x 6 mos (dx'd 6 mos post delivery of her most recent child [she has 4 kids]). Pt with    NYHA II-III symptoms.  Pt on optimal medical heart failure regimen > includes entresto, coreg, and aldactone > most recent echo 09/2017 reveals EF 25- 30 percent.   Pt reports symptoms of shortness of breath on exertion, occasional dizziness. Pt denies any chest pains, overt shortness of breath, fevers, chills.   Pt presented for planned S- ICD. > pt denies any acute symptoms at present.       My interpretation of today's ECG is sinus bradycardia at 52 BPM Narrow QRSd at 86 ms     Past Medical History:   Diagnosis Date    Cardiomyopathy     CHF (congestive heart failure)     Hypertension        History reviewed. No pertinent surgical history.    Review of patient's allergies indicates:  No Known Allergies    No current facility-administered medications on file prior to encounter.      Current Outpatient Prescriptions on File Prior to Encounter   Medication Sig    carvedilol (COREG) 25 MG tablet Take 25 mg by mouth 2 (two) times daily with meals.    spironolactone (ALDACTONE) 25 MG tablet Take 25 mg by mouth once daily.    furosemide (LASIX) 20 MG tablet Take 20 mg by mouth 2 (two) times daily.    potassium chloride SA (K-DUR,KLOR-CON) 20 MEQ tablet Take 20 mEq by mouth once daily.     Family History     Problem Relation (Age of Onset)    Cancer Mother    Hypertension Mother, Father        Social History Main Topics    Smoking status: Never Smoker    Smokeless tobacco: Not on file    Alcohol use No    Drug use: No    Sexual activity: Yes     Partners: Male     ROS Review of Systems   Constitution:  Negative for fevers/chills.   Positive for easily gets    weak and has malaise/fatigue.   HENT: Negative for ear pain and tinnitus.   Eyes: Negative for blurred vision.   Cardiovascular: . Negative for chest pain,  near-syncope, and syncope.   Respiratory:  Positive for shortness of breath on exertion   Endocrine:  Negative for polyuria.   Hematologic/Lymphatic: Negative for bruise/bleed easily.   Skin:  Negative for rash.   Musculoskeletal: Negative for joint pain and muscle weakness.   Extremity: Positive  For occasional  swelling in the lower extremities.   Gastrointestinal:  Negative for abdominal pain and change in bowel habit.   Genitourinary: Negative for frequency.   Neurological:  Positive  for occasional dizziness.   Psychiatric/Behavioral:  Negative for depression, anxiety           Objective:     Vital Signs (Most Recent):  Temp: 97.8 °F (36.6 °C) (11/16/17 0843)  Pulse: (!) 53 (11/16/17 0843)  Resp: 16 (11/16/17 0843)  BP: (!) 142/88 (11/16/17 0843) Vital Signs (24h Range):  Temp:  [97.8 °F (36.6 °C)] 97.8 °F (36.6 °C)  Pulse:  [53] 53  Resp:  [16] 16  BP: (142-143)/(84-88) 142/88     Weight: 79.4 kg (175 lb)  Body mass index is 33.07 kg/m².            Physical Exam       General: Well developed, well nourished, No distress on room air   HEENT: Head is normocephalic, atraumatic;  Lungs: Clear to auscultation bilaterally.  No wheezing. Normal respiratory effort.  Cardiovascular:  S1 S2 bradycardic, regular rhythm and normal heart sounds.    PMI is not displaced  Extremities: No LE edema.  Abdomen: Abdomen is soft, non-tender non-distended with normal bowel sounds.  Skin: Skin color, texture, turgor normal. No rashes.  Musculoskeletal: normal range of motion   Neurologic: Normal strength and tone. No focal numbness or weakness.   Psychiatric: normal mood and affect.  behavior is normal. Alert and oriented X 3.      Significant Labs: outside labs reviewed which were obtained at The medical office on  11/13/17 K 3.4, BUN/SCR 8/0.7, H/H 11.6/36.1 platelet 272. Repeat INR/CBC for today in progress.     Significant Imaging:   ECHO   CONCLUSIONS     1 - Mild to moderate left ventricular enlargement.     2 - Severely depressed left ventricular systolic function (EF 25-30%).     3 - Normal right ventricular systolic function .     4 - Impaired LV relaxation, normal LAP (grade 1 diastolic dysfunction).     5 - Mild left atrial enlargement.     6 - The estimated PA systolic pressure is 22 mmHg.     This document has been electronically    SIGNED BY: Fermin Castillo MD On: 09/15/2017 15:08      Assessment and Plan:   31 year old female with PMH HTN, peripartum CM with  NYHA II-III symptoms.  Pt on optimal medical heart failure regimen > includes entresto, coreg, and aldactone > most recent echo 09/2017 reveals EF 25- 30 percent.       * Non-ischemic cardiomyopathy    SICD implantation for primary prevention   Sedation per anesthesia     Prior to procedure, extensive discussion with patient regarding risks and benefits of  SICD implantation , and patient  would like to proceed.  The patient voices understanding and all questions have been answered. No further questions/concerns voiced at this time.                 KAREN Chand-BC  Cardiology Electrophysiology  NP   [facility]    Attending: MD Christian Serrano

## 2017-11-16 NOTE — PLAN OF CARE
Problem: Patient Care Overview  Goal: Plan of Care Review  Outcome: Ongoing (interventions implemented as appropriate)  Plan of care reviewed with pt; pt verbalizes understanding. Pt resting quietly, arouses to voice, orientedx4. VSS. Dressing to incisionx2 clean, dry, and intact. Surgical bra in place. Pt states pain is tolerable at this time. See epic flowsheet for full assessment and vital signs.

## 2017-11-16 NOTE — HPI
31 year old female with PMH HTN, peripartum CM x 6 mos (dx'd 6 mos post delivery of her most recent child [she has 4 kids]). Pt with    NYHA II-III symptoms.  Pt on optimal medical heart failure regimen > includes entresto, coreg, and aldactone > most recent echo 09/2017 reveals EF 25- 30 percent.   Pt reports symptoms of shortness of breath on exertion, occasional dizziness. Pt denies any chest pains, overt shortness of breath, fevers, chills.   Pt presented for planned S- ICD. > pt denies any acute symptoms at present.       My interpretation of today's ECG is sinus bradycardia at 52 BPM Narrow QRSd at 86 ms

## 2017-11-16 NOTE — NURSING TRANSFER
Nursing Transfer Note      11/16/2017     Transfer To: 319    Transfer via stretcher    Transfer with cardiac monitoring; Latitude communicator kit    Transported by RN    Medicines sent: Silver sulfadiazine    Chart send with patient: Yes    Notified: spouse    Patient reassessed at: 11/16/17 at 1700(date, time)

## 2017-11-17 VITALS
SYSTOLIC BLOOD PRESSURE: 111 MMHG | TEMPERATURE: 98 F | HEIGHT: 61 IN | RESPIRATION RATE: 18 BRPM | WEIGHT: 175 LBS | BODY MASS INDEX: 33.04 KG/M2 | DIASTOLIC BLOOD PRESSURE: 75 MMHG | HEART RATE: 83 BPM | OXYGEN SATURATION: 95 %

## 2017-11-17 PROCEDURE — 63600175 PHARM REV CODE 636 W HCPCS: Mod: TXP | Performed by: NURSE PRACTITIONER

## 2017-11-17 PROCEDURE — 25000003 PHARM REV CODE 250: Mod: TXP | Performed by: NURSE PRACTITIONER

## 2017-11-17 RX ORDER — CEPHALEXIN 500 MG/1
500 CAPSULE ORAL EVERY 8 HOURS
Status: DISCONTINUED | OUTPATIENT
Start: 2017-11-17 | End: 2017-11-17 | Stop reason: HOSPADM

## 2017-11-17 RX ORDER — CEPHALEXIN 500 MG/1
500 CAPSULE ORAL EVERY 8 HOURS
Qty: 15 CAPSULE | Refills: 0 | Status: SHIPPED | OUTPATIENT
Start: 2017-11-17 | End: 2018-02-23

## 2017-11-17 RX ORDER — OXYCODONE AND ACETAMINOPHEN 5; 325 MG/1; MG/1
1 TABLET ORAL EVERY 6 HOURS PRN
Qty: 10 TABLET | Refills: 0 | Status: SHIPPED | OUTPATIENT
Start: 2017-11-17 | End: 2018-04-02

## 2017-11-17 RX ADMIN — POTASSIUM CHLORIDE 20 MEQ: 1500 TABLET, EXTENDED RELEASE ORAL at 09:11

## 2017-11-17 RX ADMIN — SACUBITRIL AND VALSARTAN 1 TABLET: 49; 51 TABLET, FILM COATED ORAL at 09:11

## 2017-11-17 RX ADMIN — CEPHALEXIN 500 MG: 500 CAPSULE ORAL at 11:11

## 2017-11-17 RX ADMIN — OXYCODONE HYDROCHLORIDE AND ACETAMINOPHEN 1 TABLET: 5; 325 TABLET ORAL at 04:11

## 2017-11-17 RX ADMIN — CEFAZOLIN SODIUM 2 G: 2 SOLUTION INTRAVENOUS at 04:11

## 2017-11-17 RX ADMIN — CARVEDILOL 25 MG: 25 TABLET, FILM COATED ORAL at 07:11

## 2017-11-17 RX ADMIN — OXYCODONE HYDROCHLORIDE AND ACETAMINOPHEN 1 TABLET: 5; 325 TABLET ORAL at 10:11

## 2017-11-17 NOTE — ANESTHESIA POSTPROCEDURE EVALUATION
"Anesthesia Post Evaluation    Patient: Cj Silva    Procedure(s) Performed: Procedure(s) (LRB):  INSERTION-ICD-SINGLE (N/A)    Final Anesthesia Type: general  Patient location during evaluation: Cath Lab  Patient participation: Yes- Able to Participate  Level of consciousness: awake and alert  Post-procedure vital signs: reviewed and stable  Pain management: adequate  Airway patency: patent  PONV status at discharge: No PONV  Anesthetic complications: no      Cardiovascular status: hemodynamically unstable  Respiratory status: unassisted, spontaneous ventilation and nasal cannula  Hydration status: euvolemic          Visit Vitals  /75 (BP Location: Right arm, Patient Position: Sitting)   Pulse 83   Temp 36.6 °C (97.8 °F) (Oral)   Resp 18   Ht 5' 1" (1.549 m)   Wt 79.4 kg (175 lb)   LMP 08/24/2017   SpO2 95%   Breastfeeding? No   BMI 33.07 kg/m²       Pain/Chel Score: Pain Assessment Performed: Yes (11/17/2017  8:00 AM)  Presence of Pain: complains of pain/discomfort (11/17/2017  9:00 AM)  Pain Rating Prior to Med Admin: 6 (11/17/2017 10:24 AM)  Pain Rating Post Med Admin: 5 (11/16/2017 10:34 PM)  Chel Score: 9 (11/16/2017  5:00 PM)      "

## 2017-11-17 NOTE — DISCHARGE SUMMARY
[facility]  Cardiac Electrophysiology  Discharge Summary      Patient Name: Cj Silva  MRN: [id number]  Admission Date: 11/16/2017  Hospital Length of Stay: 0 days  Discharge Date and Time:  11/17/2017 9:16 AM  Attending Physician: Christian Serrano MD    Discharging Provider: Isidro Hamilton NP  Primary Care Physician: Kendall Donnelly MD    HPI:   31 year old female with PMH HTN, peripartum CM x 6 mos (dx'd 6 mos post delivery of her most recent child [she has 4 kids]). Pt with    NYHA II-III symptoms.  Pt on optimal medical heart failure regimen > includes entresto, coreg, and aldactone > most recent echo 09/2017 reveals EF 25- 30 percent.   Pt reports symptoms of shortness of breath on exertion, occasional dizziness. Pt denies any chest pains, overt shortness of breath, fevers, chills.   Pt presented for planned S- ICD. > pt denies any acute symptoms at admission      Procedure(s) (LRB):  implant: ICD Sub Q.         Hospital Course:  Pt underwent outpatient procedure , tolerated procedure, no acute complications noted. Ambulating with no issues    Left lateral  site with aquacel dressing CDI, no bleeding or hematoma noted.  Mid chest site CDI   Pt to continue antibiotics keflex for 5 days.   Pt with aquacel dressing to remain intact until wound clinic visit. Pt to follow up with device clinic in 1 week for wound check , and 3 months with ALDO Armenta   Discharge plans/instructions discussed with patient  who verbalized understanding and agreement of plans of care.  No further questions or concerns  voiced at this time.     Physical Exam:   Gen: no acute distress, pleasant patient answering questions appropriately  CVS: regular rate and rhythm, S1S2 normal, no murmurs/rubs/gallops  CHEST: clear to auscultation bilaterally, no wheezes/rales/ronchi  ABD: Soft, non-tender, nondistended, bowel sounds present  EXT: No Edema  NEURO: awake, alert, and oriented          Consults: anesthesia        Final Active Diagnoses:    Diagnosis Date Noted POA    PRINCIPAL PROBLEM:  Non-ischemic cardiomyopathy [I42.8] 11/16/2017 Yes    Peripartum cardiomyopathy [O90.3] 02/17/2017 Yes      Problems Resolved During this Admission:    Diagnosis Date Noted Date Resolved POA       Discharged Condition: good    Disposition: Home or Self Care    Follow Up:  Follow-up Information     [facility] ARRHYTHMIA In 1 week.    Specialty:  Arrhythmia  Why:  For wound re-check  Contact information:  [address]  [facility] [address]  [phone]           DARRIN Sherman In 3 months.    Specialty:  Cardiology  Contact information:  [address]  [facility] [address]  [phone]                 Patient Instructions:     Diet general   Order Specific Question Answer Comments   Additional restrictions: Cardiac (Low Na/Chol)      Call MD for:  temperature >100.4     Call MD for:  persistent nausea and vomiting or diarrhea     Call MD for:  severe uncontrolled pain     Call MD for:  redness, tenderness, or signs of infection (pain, swelling, redness, odor or green/yellow discharge around incision site)     Call MD for:  difficulty breathing or increased cough     Call MD for:  severe persistent headache     Call MD for:  worsening rash     Call MD for:  persistent dizziness, light-headedness, or visual disturbances     Call MD for:  increased confusion or weakness     Call MD for:   Scheduling Instructions: For any concerning medical symptoms     Leave dressing on - Keep it clean, dry, and intact until clinic visit       Medications:  Reconciled Home Medications:   Current Discharge Medication List      START taking these medications    Details   cephALEXin (KEFLEX) 500 MG capsule Take 1 capsule (500 mg total) by mouth every 8 (eight) hours.  Qty: 15 capsule, Refills: 0      oxyCODONE-acetaminophen (PERCOCET) 5-325 mg per tablet Take 1 tablet by mouth every 6 (six) hours as needed for Pain.  Qty: 10 tablet,  Refills: 0         CONTINUE these medications which have NOT CHANGED    Details   carvedilol (COREG) 25 MG tablet Take 25 mg by mouth 2 (two) times daily with meals.      sacubitril-valsartan (ENTRESTO) 49-51 mg per tablet Take 1 tablet by mouth 2 (two) times daily.  Qty: 60 tablet, Refills: 5    Associated Diagnoses: Chronic systolic heart failure      spironolactone (ALDACTONE) 25 MG tablet Take 25 mg by mouth once daily.      furosemide (LASIX) 20 MG tablet Take 20 mg by mouth every other day.       potassium chloride SA (K-DUR,KLOR-CON) 20 MEQ tablet Take 20 mEq by mouth every other day.              KAREN Chand-BC  Cardiology Electrophysiology  NP   [facility]    Attending: MD Zach Gonzales

## 2017-11-17 NOTE — PROGRESS NOTES
Pt DC'd per MD order. Discharge instructions given including activity, wound care, S&S of infections, future appointments, and when to call MD. Medications reviewed including drug name, indication, dosage, frequency, and when to take next dose. Prescriptions given to patient. Telemetry and PIV DC'd, catheter tip intact. Pt left unit via wheelchair with significant other to meet transport at front entrance.

## 2017-11-17 NOTE — PLAN OF CARE
Problem: Patient Care Overview  Goal: Plan of Care Review  Outcome: Ongoing (interventions implemented as appropriate)  Plan of care discussed with patient. Patient is free of fall/trauma/injury. Denies CP, SOB, or pain/discomfort. All questions addressed. Will continue to monitor. Patient to be discharged this AM. Awaiting medicaid transport.

## 2017-11-21 DIAGNOSIS — Z95.810 CARDIAC DEFIBRILLATOR IN SITU: ICD-10-CM

## 2017-11-21 DIAGNOSIS — I42.8 NICM (NONISCHEMIC CARDIOMYOPATHY): Primary | ICD-10-CM

## 2017-11-30 ENCOUNTER — CLINICAL SUPPORT (OUTPATIENT)
Dept: ELECTROPHYSIOLOGY | Facility: CLINIC | Age: 32
End: 2017-11-30
Attending: INTERNAL MEDICINE
Payer: MEDICAID

## 2017-11-30 DIAGNOSIS — Z95.810 CARDIAC DEFIBRILLATOR IN SITU: ICD-10-CM

## 2017-11-30 DIAGNOSIS — I42.8 NICM (NONISCHEMIC CARDIOMYOPATHY): ICD-10-CM

## 2017-11-30 PROCEDURE — 93282 PRGRMG EVAL IMPLANTABLE DFB: CPT | Mod: PBBFAC,NTX | Performed by: INTERNAL MEDICINE

## 2018-02-23 ENCOUNTER — HOSPITAL ENCOUNTER (OUTPATIENT)
Dept: CARDIOLOGY | Facility: CLINIC | Age: 33
Discharge: HOME | End: 2018-02-23
Payer: MEDICAID

## 2018-02-23 ENCOUNTER — OFFICE VISIT (OUTPATIENT)
Dept: ELECTROPHYSIOLOGY | Facility: CLINIC | Age: 33
End: 2018-02-23
Payer: MEDICAID

## 2018-02-23 ENCOUNTER — CLINICAL SUPPORT (OUTPATIENT)
Dept: ELECTROPHYSIOLOGY | Facility: CLINIC | Age: 33
End: 2018-02-23
Payer: MEDICAID

## 2018-02-23 VITALS
OXYGEN SATURATION: 99 % | DIASTOLIC BLOOD PRESSURE: 108 MMHG | WEIGHT: 175.06 LBS | BODY MASS INDEX: 33.05 KG/M2 | SYSTOLIC BLOOD PRESSURE: 163 MMHG | HEART RATE: 65 BPM | HEIGHT: 61 IN

## 2018-02-23 DIAGNOSIS — I42.8 NON-ISCHEMIC CARDIOMYOPATHY: Primary | ICD-10-CM

## 2018-02-23 DIAGNOSIS — I50.30 CHF WITH LEFT VENTRICULAR DIASTOLIC DYSFUNCTION, NYHA CLASS 2: ICD-10-CM

## 2018-02-23 DIAGNOSIS — Z95.810 CARDIAC DEFIBRILLATOR IN SITU: ICD-10-CM

## 2018-02-23 DIAGNOSIS — Z95.810 PRESENCE OF AUTOMATIC CARDIOVERTER/DEFIBRILLATOR (AICD): Primary | ICD-10-CM

## 2018-02-23 DIAGNOSIS — I42.9 CARDIOMYOPATHY, UNSPECIFIED TYPE: ICD-10-CM

## 2018-02-23 DIAGNOSIS — I42.8 NICM (NONISCHEMIC CARDIOMYOPATHY): ICD-10-CM

## 2018-02-23 PROCEDURE — 93005 ELECTROCARDIOGRAM TRACING: CPT | Mod: PBBFAC,NTX | Performed by: INTERNAL MEDICINE

## 2018-02-23 PROCEDURE — 99213 OFFICE O/P EST LOW 20 MIN: CPT | Mod: PBBFAC,TXP,25 | Performed by: NURSE PRACTITIONER

## 2018-02-23 PROCEDURE — 93010 ELECTROCARDIOGRAM REPORT: CPT | Mod: S$PBB,NTX,, | Performed by: INTERNAL MEDICINE

## 2018-02-23 PROCEDURE — 3008F BODY MASS INDEX DOCD: CPT | Mod: NTX,,, | Performed by: NURSE PRACTITIONER

## 2018-02-23 PROCEDURE — 93282 PRGRMG EVAL IMPLANTABLE DFB: CPT | Mod: PBBFAC,NTX | Performed by: INTERNAL MEDICINE

## 2018-02-23 PROCEDURE — 99214 OFFICE O/P EST MOD 30 MIN: CPT | Mod: S$PBB,NTX,, | Performed by: NURSE PRACTITIONER

## 2018-02-23 PROCEDURE — 99999 PR PBB SHADOW E&M-EST. PATIENT-LVL III: CPT | Mod: PBBFAC,TXP,, | Performed by: NURSE PRACTITIONER

## 2018-02-23 NOTE — PROGRESS NOTES
Subjective:    Patient ID:  Cj Silva is a 32 y.o. female who presents for an S-ICD Check.     Cj Silva is a patient of Dr. Serrano.    HPI     Ms. Silva is a 30 y/o female   HTN>>on medication  peripartum CM x 6 mos (dx'd 6 mos post delivery of her most recent child [she has 4 kids]).  Ultimately developed NYHA II-III sx.  LH on occasion, but no syncope.  LVEF was 20-25% in March of 2017.  Of note, her brother suffered SCD at age 33.  She underwent successful S-ICD placement (11/16/17) without complication.     Since her last office visit, Ms. Silva reports experiencing continued VO>>this is however, improved from baseline. In addition, she notes occasional dizziness w/standing for extended periods of time. She denies chest pain, palpitations, or syncope. Her BP is elevated today in clinic (163/108 >>asymptomatic>>she missed her last two doses of Coreg)>>she plans to take this upon returning home and will continue to check her BPs until they return to a range that is WNL>>will report any issues with this.      Recent cardiac studies:  Echo (09/15/17) CONCLUSIONS:     1 - Mild to moderate left ventricular enlargement.     2 - Severely depressed left ventricular systolic function (EF 25-30%).     3 - Normal right ventricular systolic function .     4 - Impaired LV relaxation, normal LAP (grade 1 diastolic dysfunction).     5 - Mild left atrial enlargement.     6 - The estimated PA systolic pressure is 22 mmHg.    Device Interrogation (02/23/18) reveals stable device function>>electrode impedance OK. No AF noted. Remaining battery longevity to LAURIE:98%.       I reviewed today's ECG which demonstrated NSR at 65 bpm; , QRS 84, and QTc 470.    Review of Systems   Constitution: Negative for diaphoresis and malaise/fatigue.   HENT: Negative for nosebleeds.    Eyes: Negative for double vision.   Cardiovascular: Positive for dyspnea on exertion (improved from baseline). Negative for  chest pain, irregular heartbeat, near-syncope, palpitations and syncope.   Respiratory: Negative for shortness of breath.    Skin: Negative.    Musculoskeletal: Negative.    Gastrointestinal: Negative for hematemesis and hematochezia.   Genitourinary: Negative for hematuria.   Neurological: Positive for dizziness and light-headedness.   Psychiatric/Behavioral: Negative for altered mental status.        Objective:    Physical Exam   Constitutional: She is oriented to person, place, and time. She appears well-developed and well-nourished.   HENT:   Head: Normocephalic and atraumatic.   Eyes: Pupils are equal, round, and reactive to light.   Cardiovascular: Normal rate and regular rhythm.    Pulmonary/Chest: Effort normal.   Musculoskeletal: Normal range of motion.   Neurological: She is alert and oriented to person, place, and time.   Skin: She is not diaphoretic.   Vitals reviewed.        Assessment:       1. Non-ischemic cardiomyopathy    2. CHF with left ventricular diastolic dysfunction, NYHA class 2         Plan:       Ms. Silva is doing well from a device perspective with stable ldevice function without arrhythmia noted.     Continue current medication regimen and device settings.   Follow up in device clinic as scheduled.   Follow up in EP clinic in 1 year with repeat Echo, sooner as needed.     Randi Armenta, [place], APRN, FNP-C      (A copy of today's note was sent to Dr. Serrano.)

## 2018-04-02 ENCOUNTER — OFFICE VISIT (OUTPATIENT)
Dept: TRANSPLANT | Facility: CLINIC | Age: 33
End: 2018-04-02
Payer: MEDICAID

## 2018-04-02 ENCOUNTER — LAB VISIT (OUTPATIENT)
Dept: LAB | Facility: HOSPITAL | Age: 33
End: 2018-04-02
Attending: INTERNAL MEDICINE
Payer: MEDICAID

## 2018-04-02 VITALS
HEART RATE: 63 BPM | HEIGHT: 61 IN | BODY MASS INDEX: 33.55 KG/M2 | DIASTOLIC BLOOD PRESSURE: 88 MMHG | WEIGHT: 177.69 LBS | SYSTOLIC BLOOD PRESSURE: 136 MMHG

## 2018-04-02 DIAGNOSIS — I50.30 CHF WITH LEFT VENTRICULAR DIASTOLIC DYSFUNCTION, NYHA CLASS 2: Primary | ICD-10-CM

## 2018-04-02 DIAGNOSIS — R42 POSITIONAL LIGHTHEADEDNESS: ICD-10-CM

## 2018-04-02 DIAGNOSIS — I50.22 CHRONIC SYSTOLIC HEART FAILURE: ICD-10-CM

## 2018-04-02 LAB
ALBUMIN SERPL BCP-MCNC: 3.4 G/DL
ALP SERPL-CCNC: 84 U/L
ALT SERPL W/O P-5'-P-CCNC: 10 U/L
ANION GAP SERPL CALC-SCNC: 5 MMOL/L
AST SERPL-CCNC: 12 U/L
BILIRUB SERPL-MCNC: 0.2 MG/DL
BNP SERPL-MCNC: 77 PG/ML
BUN SERPL-MCNC: 11 MG/DL
CALCIUM SERPL-MCNC: 9.1 MG/DL
CHLORIDE SERPL-SCNC: 107 MMOL/L
CO2 SERPL-SCNC: 27 MMOL/L
CREAT SERPL-MCNC: 0.7 MG/DL
EST. GFR  (AFRICAN AMERICAN): >60 ML/MIN/1.73 M^2
EST. GFR  (NON AFRICAN AMERICAN): >60 ML/MIN/1.73 M^2
GLUCOSE SERPL-MCNC: 68 MG/DL
POTASSIUM SERPL-SCNC: 3.8 MMOL/L
PROT SERPL-MCNC: 7.2 G/DL
SODIUM SERPL-SCNC: 139 MMOL/L

## 2018-04-02 PROCEDURE — 80053 COMPREHEN METABOLIC PANEL: CPT | Mod: TXP

## 2018-04-02 PROCEDURE — 83880 ASSAY OF NATRIURETIC PEPTIDE: CPT | Mod: TXP

## 2018-04-02 PROCEDURE — 99999 PR PBB SHADOW E&M-EST. PATIENT-LVL III: CPT | Mod: PBBFAC,TXP,, | Performed by: PHYSICIAN ASSISTANT

## 2018-04-02 PROCEDURE — 36415 COLL VENOUS BLD VENIPUNCTURE: CPT | Mod: NTX

## 2018-04-02 PROCEDURE — 99214 OFFICE O/P EST MOD 30 MIN: CPT | Mod: S$PBB,,, | Performed by: PHYSICIAN ASSISTANT

## 2018-04-02 PROCEDURE — 99213 OFFICE O/P EST LOW 20 MIN: CPT | Mod: PBBFAC,TXP | Performed by: PHYSICIAN ASSISTANT

## 2018-04-02 NOTE — LETTER
April 3, 2018        Kendall Donnelly  [address] [facility] [address]  [place] [address]  Phone: [phone]  Fax: [phone]             [facility]  Ciera Gomez  [facility] [address]  Phone: [phone]   Patient: Cj Silva   MR Number: [id number]   YOB: 1985   Date of Visit: 4/2/2018       Dear Dr. Kendall Donnelly    Thank you for referring Cj Silva to me for evaluation. Attached you will find relevant portions of my assessment and plan of care.    If you have questions, please do not hesitate to call me. I look forward to following Cj Silva along with you.    Sincerely,    GUCCI Tavera    Enclosure    If you would like to receive this communication electronically, please contact [email or web address] or [phone] to request [employer] Link access.    [employer] Link is a tool which provides read-only access to select patient information with whom you have a relationship. Its easy to use and provides real time access to review your patients record including encounter summaries, notes, results, and demographic information.    If you feel you have received this communication in error or would no longer like to receive these types of communications, please e-mail [email or web address]

## 2018-04-03 NOTE — PROGRESS NOTES
Subjective:       HPI:  Ms. Silva is a 32 y.o. year old Black or  female who has presents for 9 month follow up. Lase seen By Dr Euceda in September 2017 where her entresto was increased to 2 tablets BID. Ms. Silva is a 31 y.o. year old Black or  female with a history of HTN who subsequently developed heart failure following her last pregnancy (4 kids, ages 11, 8, 2, and 7 months) and was diagnosed with DCM thought to be postpartum. Patient has progressively improved significantly since that time.  She continues to say she has been doing well, denies any cardiopulmonary symptoms at all. Denies any N/V/F/C, lightheadedness, dizziness, PND, orthopnea, LE edema, abdominal pain, abdominal pressure, chest pain, chest pressure. She does feel a little more fatigued now than previous but says this is because she has started to exercise at the gym again, last 2 weeks. NYHA FC I.     Past Medical History:   Diagnosis Date    Cardiomyopathy     CHF (congestive heart failure)     Hypertension      No past surgical history on file.    Family History   Problem Relation Age of Onset    Hypertension Mother     Cancer Mother     Hypertension Father        ROS  Constitution: Positive for weight gain. Negative for chills, decreased appetite, diaphoresis, fever, weakness, malaise/fatigue and weight loss.   Eyes: Negative for visual disturbance.   Cardiovascular: Negative for chest pain, cyanosis, dyspnea on exertion, irregular heartbeat, leg swelling, near-syncope, orthopnea (down to 1 pillow at night, no PND), palpitations, paroxysmal nocturnal dyspnea and syncope.   Respiratory: Negative for cough, shortness of breath, sleep disturbances due to breathing, snoring, sputum production and wheezing.    Hematologic/Lymphatic: Negative for adenopathy and bleeding problem. Does not bruise/bleed easily.   Skin: Negative for color change, poor wound healing, rash, skin cancer and suspicious lesions.  "  Musculoskeletal: Negative for back pain, falls, gout, joint pain and muscle weakness.   Gastrointestinal: Negative for bloating, abdominal pain, anorexia, constipation, diarrhea, heartburn, hematemesis, hematochezia, melena, nausea and vomiting.   Genitourinary: Negative for nocturia and urgency.   Neurological: Negative for excessive daytime sleepiness, dizziness, focal weakness, headaches, light-headedness, paresthesias and tremors.   Psychiatric/Behavioral: Negative for depression and memory loss. The patient does not have insomnia and is not nervous/anxious.          Objective:   Blood pressure 136/88, pulse 63, height 5' 1" (1.549 m), weight 80.6 kg (177 lb 11.1 oz).body mass index is 33.57 kg/m².    Physical Exam  Constitutional: She is oriented to person, place, and time. She appears well-developed and well-nourished. No distress.   HENT:   Head: Normocephalic and atraumatic.   Right Ear: External ear normal.   Left Ear: External ear normal.   Nose: Nose normal.   Mouth/Throat: Oropharynx is clear and moist. No oropharyngeal exudate.   Eyes: Pupils are equal, round, and reactive to light. No scleral icterus.   Neck: Trachea normal and normal range of motion. Neck supple. JVD (6cm H2O) present. No hepatojugular reflux present.   Cardiovascular: Normal rate, regular rhythm, S1 normal, S2 normal, normal heart sounds, intact distal pulses and normal pulses.  PMI is not displaced.  Exam reveals no gallop.    Pulmonary/Chest: Effort normal and breath sounds normal. She has no wheezes. She has no rhonchi. She has no rales.   Abdominal: Soft. Normal appearance and bowel sounds are normal. She exhibits no distension, no ascites and no mass. There is no tenderness.   Musculoskeletal: She exhibits no edema.   Lymphadenopathy:     She has no cervical adenopathy.   Neurological: She is alert and oriented to person, place, and time. Gait normal.   Skin: Skin is warm, dry and intact. She is not diaphoretic. No cyanosis. " Nails show no clubbing.   Psychiatric: She has a normal mood and affect. Her speech is normal.   Nursing note and vitals reviewed.  Labs:      Chemistry        Component Value Date/Time     04/02/2018 1240    K 3.8 04/02/2018 1240     04/02/2018 1240    CO2 27 04/02/2018 1240    BUN 11 04/02/2018 1240    CREATININE 0.7 04/02/2018 1240    GLU 68 (L) 04/02/2018 1240        Component Value Date/Time    CALCIUM 9.1 04/02/2018 1240    ALKPHOS 84 04/02/2018 1240    AST 12 04/02/2018 1240    ALT 10 04/02/2018 1240    BILITOT 0.2 04/02/2018 1240    ESTGFRAFRICA >60.0 04/02/2018 1240    EGFRNONAA >60.0 04/02/2018 1240          No results found for: MG  Lab Results   Component Value Date    WBC 6.27 11/16/2017    HGB 11.8 (L) 11/16/2017    HCT 36.3 (L) 11/16/2017    MCV 84 11/16/2017     11/16/2017     BNP   Date Value Ref Range Status   04/02/2018 77 0 - 99 pg/mL Final     Comment:     Values of less than 100 pg/ml are consistent with non-CHF populations.   09/15/2017 57 0 - 99 pg/mL Final     Comment:     Values of less than 100 pg/ml are consistent with non-CHF populations.   06/19/2017 48 0 - 99 pg/mL Final     Comment:     Values of less than 100 pg/ml are consistent with non-CHF populations.     No results found for this or any previous visit.    Labs were reviewed with the patient.    Assessment:      1. CHF with left ventricular diastolic dysfunction, NYHA class 2    2. Peripartum cardiomyopathy    3. Chronic systolic heart failure        Plan:   Added on TSH and free T4 which were normal  Will increase entresto to  mg BID and check labs locally in 2 weeks.  She continues to follow locally monthly and here every 6 months, will get CPX at that time of return visit.  Patient is now NYHA I ACC stage D  Recommend 2 gram sodium restriction and 1500cc fluid restriction.  Encourage physical activity with graded exercise program.  Requested patient to weigh themselves daily, and to notify us if their  weight increases by more than 3 lbs in 1 day or 5 lbs in 1 week.       Aysha Kelly PA

## 2018-04-18 ENCOUNTER — TELEPHONE (OUTPATIENT)
Dept: TRANSPLANT | Facility: CLINIC | Age: 33
End: 2018-04-18

## 2018-04-18 NOTE — TELEPHONE ENCOUNTER
4/17/18 Spoke to pt to see if she was going to get her lab drawn, pt told me that she was on her way there. Call  office to see if the lab was drawn they inform me that pt never show up. I called pt  home phone her  answer the phone and told me she wasn't there. Called pt cell phone didn't get in answer    4/18/18 Called pt phone 5 time today left two message for her to call office for we can see what happen with her not getting her lab drawn... I was getting sent to the [employer]

## 2018-05-28 ENCOUNTER — CLINICAL SUPPORT (OUTPATIENT)
Dept: ELECTROPHYSIOLOGY | Facility: CLINIC | Age: 33
End: 2018-05-28
Attending: INTERNAL MEDICINE
Payer: MEDICAID

## 2018-05-28 DIAGNOSIS — Z95.810 PRESENCE OF AUTOMATIC CARDIOVERTER/DEFIBRILLATOR (AICD): ICD-10-CM

## 2018-05-28 PROCEDURE — 93295 DEV INTERROG REMOTE 1/2/MLT: CPT | Mod: ,,, | Performed by: INTERNAL MEDICINE

## 2018-05-28 PROCEDURE — 93296 REM INTERROG EVL PM/IDS: CPT | Mod: PBBFAC | Performed by: INTERNAL MEDICINE

## 2018-07-26 NOTE — NURSING
Patient removed from VAD Potential membership list due to followed by HF section. VAD episode closed. Potential Folder Discarded and all signed paperwork scanned into EMR. Will initiate VAD education when or if indicated by provider team.

## 2018-08-28 ENCOUNTER — TELEPHONE (OUTPATIENT)
Dept: ELECTROPHYSIOLOGY | Facility: CLINIC | Age: 33
End: 2018-08-28

## 2018-08-28 NOTE — TELEPHONE ENCOUNTER
Left a voice message for patient to return my call. She needs to send a remote device transmission.

## 2018-08-29 ENCOUNTER — TELEPHONE (OUTPATIENT)
Dept: ELECTROPHYSIOLOGY | Facility: CLINIC | Age: 33
End: 2018-08-29

## 2018-08-29 NOTE — TELEPHONE ENCOUNTER
Patient is over due for her remote Sub Q ICD transmission. I called patient to remind her to send. No answer. She does not look like she is around her monitor. I called patients number again and asked for Ms. Silva. I was informed that she was not around at the moment. I left my name and direct number for her to return my call.

## 2018-10-01 ENCOUNTER — CLINICAL SUPPORT (OUTPATIENT)
Dept: ELECTROPHYSIOLOGY | Facility: CLINIC | Age: 33
End: 2018-10-01
Attending: INTERNAL MEDICINE
Payer: MEDICAID

## 2018-10-01 DIAGNOSIS — Z95.810 PRESENCE OF AUTOMATIC CARDIOVERTER/DEFIBRILLATOR (AICD): ICD-10-CM

## 2018-10-01 PROCEDURE — 93295 DEV INTERROG REMOTE 1/2/MLT: CPT | Mod: ,,, | Performed by: INTERNAL MEDICINE

## 2018-10-01 PROCEDURE — 93296 REM INTERROG EVL PM/IDS: CPT | Mod: PBBFAC | Performed by: INTERNAL MEDICINE

## 2018-10-31 ENCOUNTER — TELEPHONE (OUTPATIENT)
Dept: ELECTROPHYSIOLOGY | Facility: CLINIC | Age: 33
End: 2018-10-31

## 2018-12-04 ENCOUNTER — TELEPHONE (OUTPATIENT)
Dept: ELECTROPHYSIOLOGY | Facility: CLINIC | Age: 33
End: 2018-12-04

## 2018-12-04 NOTE — TELEPHONE ENCOUNTER
Tried calling patient to remind her of her appointment tomorrow but she have a voicemail box that is not set up

## 2018-12-05 ENCOUNTER — OFFICE VISIT (OUTPATIENT)
Dept: ELECTROPHYSIOLOGY | Facility: CLINIC | Age: 33
End: 2018-12-05
Payer: MEDICAID

## 2018-12-05 ENCOUNTER — HOSPITAL ENCOUNTER (OUTPATIENT)
Dept: CARDIOLOGY | Facility: CLINIC | Age: 33
Discharge: HOME | End: 2018-12-05
Attending: PHYSICIAN ASSISTANT
Payer: MEDICAID

## 2018-12-05 ENCOUNTER — HOSPITAL ENCOUNTER (OUTPATIENT)
Dept: CARDIOLOGY | Facility: CLINIC | Age: 33
Discharge: HOME | End: 2018-12-05
Payer: MEDICAID

## 2018-12-05 VITALS
BODY MASS INDEX: 32.1 KG/M2 | SYSTOLIC BLOOD PRESSURE: 165 MMHG | WEIGHT: 170 LBS | HEART RATE: 83 BPM | DIASTOLIC BLOOD PRESSURE: 103 MMHG | HEIGHT: 61 IN

## 2018-12-05 VITALS
HEART RATE: 56 BPM | WEIGHT: 171.94 LBS | BODY MASS INDEX: 32.46 KG/M2 | HEIGHT: 61 IN | DIASTOLIC BLOOD PRESSURE: 84 MMHG | SYSTOLIC BLOOD PRESSURE: 128 MMHG

## 2018-12-05 DIAGNOSIS — I42.8 NON-ISCHEMIC CARDIOMYOPATHY: ICD-10-CM

## 2018-12-05 DIAGNOSIS — I49.9 CARDIAC ARRHYTHMIA, UNSPECIFIED CARDIAC ARRHYTHMIA TYPE: ICD-10-CM

## 2018-12-05 DIAGNOSIS — I50.30 CHF WITH LEFT VENTRICULAR DIASTOLIC DYSFUNCTION, NYHA CLASS 2: ICD-10-CM

## 2018-12-05 LAB
ASCENDING AORTA: 2.94 CM
AV INDEX (PROSTH): 0.73
AV MEAN GRADIENT: 3.33 MMHG
AV PEAK GRADIENT: 5.76 MMHG
AV VALVE AREA: 2.28 CM2
BSA FOR ECHO PROCEDURE: 1.82 M2
CV ECHO LV RWT: 0.25 CM
DOP CALC AO PEAK VEL: 1.2 M/S
DOP CALC AO VTI: 24.38 CM
DOP CALC LVOT AREA: 3.14 CM2
DOP CALC LVOT DIAMETER: 2 CM
DOP CALC LVOT STROKE VOLUME: 55.58 CM3
DOP CALCLVOT PEAK VEL VTI: 17.7 CM
E WAVE DECELERATION TIME: 214.92 MSEC
E/A RATIO: 0.99
E/E' RATIO: 7.5
ECHO LV POSTERIOR WALL: 0.71 CM (ref 0.6–1.1)
FRACTIONAL SHORTENING: 23 % (ref 28–44)
INTERVENTRICULAR SEPTUM: 0.74 CM (ref 0.6–1.1)
IVRT: 0.1 MSEC
LA MAJOR: 4.7 CM
LA MINOR: 5.22 CM
LA WIDTH: 3.61 CM
LEFT ATRIUM SIZE: 3.64 CM
LEFT ATRIUM VOLUME INDEX: 31.3 ML/M2
LEFT ATRIUM VOLUME: 55.25 CM3
LEFT INTERNAL DIMENSION IN SYSTOLE: 4.32 CM (ref 2.1–4)
LEFT VENTRICLE DIASTOLIC VOLUME INDEX: 88.16 ML/M2
LEFT VENTRICLE DIASTOLIC VOLUME: 155.42 ML
LEFT VENTRICLE MASS INDEX: 83.6 G/M2
LEFT VENTRICLE SYSTOLIC VOLUME INDEX: 47.5 ML/M2
LEFT VENTRICLE SYSTOLIC VOLUME: 83.78 ML
LEFT VENTRICULAR INTERNAL DIMENSION IN DIASTOLE: 5.63 CM (ref 3.5–6)
LEFT VENTRICULAR MASS: 147.4 G
LV LATERAL E/E' RATIO: 6.25
LV SEPTAL E/E' RATIO: 9.38
MV PEAK A VEL: 0.76 M/S
MV PEAK E VEL: 0.75 M/S
PISA TR MAX VEL: 1.99 M/S
PULM VEIN S/D RATIO: 0.62
PV PEAK D VEL: 0.45 M/S
PV PEAK S VEL: 0.28 M/S
RA MAJOR: 3.74 CM
RA PRESSURE: 3 MMHG
RA WIDTH: 3.22 CM
RIGHT VENTRICULAR END-DIASTOLIC DIMENSION: 3.45 CM
RV TISSUE DOPPLER FREE WALL SYSTOLIC VELOCITY 1 (APICAL 4 CHAMBER VIEW): 12.96 M/S
SINUS: 3.23 CM
STJ: 2.65 CM
TDI LATERAL: 0.12
TDI SEPTAL: 0.08
TDI: 0.1
TR MAX PG: 15.84 MMHG
TRICUSPID ANNULAR PLANE SYSTOLIC EXCURSION: 1.78 CM
TV REST PULMONARY ARTERY PRESSURE: 18.84 MMHG

## 2018-12-05 PROCEDURE — C8929 TTE W OR WO FOL WCON,DOPPLER: HCPCS | Mod: PBBFAC | Performed by: INTERNAL MEDICINE

## 2018-12-05 PROCEDURE — 99214 OFFICE O/P EST MOD 30 MIN: CPT | Mod: S$PBB,,, | Performed by: INTERNAL MEDICINE

## 2018-12-05 PROCEDURE — 93010 ELECTROCARDIOGRAM REPORT: CPT | Mod: S$PBB,,, | Performed by: INTERNAL MEDICINE

## 2018-12-05 PROCEDURE — 99213 OFFICE O/P EST LOW 20 MIN: CPT | Mod: PBBFAC,25 | Performed by: INTERNAL MEDICINE

## 2018-12-05 PROCEDURE — 99999 PR PBB SHADOW E&M-EST. PATIENT-LVL III: CPT | Mod: PBBFAC,,, | Performed by: INTERNAL MEDICINE

## 2018-12-05 PROCEDURE — 93306 TTE W/DOPPLER COMPLETE: CPT | Mod: 26,S$PBB,, | Performed by: INTERNAL MEDICINE

## 2018-12-05 PROCEDURE — 93005 ELECTROCARDIOGRAM TRACING: CPT | Mod: PBBFAC | Performed by: INTERNAL MEDICINE

## 2018-12-05 NOTE — PROGRESS NOTES
Subjective:    Patient ID:  Cj Silva is a 32 y.o. female who presents for evaluation of peripartum cardiomyopathy      HPI     32 y.o. F  HTN  peripartum CM x 6 mos (dx'd 6 mos post delivery of her most recent child [she has 4 kids]).    Now has NYHA II-III sx.  LH on occasion, but no syncope.  Echo today is pending. LVEF was 20-25% in 3/2017.    brother suffered SCD at age 33.  CPX: HR to 162 bpm. No ischemic changes on ECG.    S-ICD implanted. Working well. No events.    My interpretation of today's ECG is NSR. Narrow QRSd.    Review of Systems   Constitution: Negative. Negative for weakness and malaise/fatigue.   HENT: Negative.  Negative for ear pain and tinnitus.    Eyes: Negative for blurred vision.   Cardiovascular: Positive for dyspnea on exertion. Negative for chest pain, near-syncope, palpitations and syncope.   Respiratory: Positive for shortness of breath.    Endocrine: Negative.  Negative for polyuria.   Hematologic/Lymphatic: Does not bruise/bleed easily.   Skin: Negative.  Negative for rash.   Musculoskeletal: Negative.  Negative for joint pain and muscle weakness.   Gastrointestinal: Negative.  Negative for abdominal pain and change in bowel habit.   Genitourinary: Negative for frequency.   Neurological: Negative.  Negative for dizziness.   Psychiatric/Behavioral: Negative.  Negative for depression. The patient is not nervous/anxious.    Allergic/Immunologic: Negative for environmental allergies.        Objective:    Physical Exam   Constitutional: She is oriented to person, place, and time. Vital signs are normal. She appears well-developed and well-nourished. She is active and cooperative.   HENT:   Head: Normocephalic and atraumatic.   Eyes: Conjunctivae and EOM are normal.   Neck: Normal range of motion. Carotid bruit is not present. No tracheal deviation and no edema present. No thyroid mass and no thyromegaly present.   Cardiovascular: Normal rate, regular rhythm, normal heart  sounds, intact distal pulses and normal pulses.  No extrasystoles are present. PMI is not displaced. Exam reveals no gallop and no friction rub.   No murmur heard.  Pulmonary/Chest: Effort normal and breath sounds normal. No respiratory distress. She has no wheezes. She has no rales.   Abdominal: Soft. Normal appearance. She exhibits no distension. There is no hepatosplenomegaly.   Musculoskeletal: Normal range of motion.   Neurological: She is alert and oriented to person, place, and time. Coordination normal.   Skin: Skin is warm and dry. No rash noted.   Psychiatric: She has a normal mood and affect. Her speech is normal and behavior is normal. Thought content normal. Cognition and memory are normal.   Nursing note and vitals reviewed.        Assessment:       1. Peripartum cardiomyopathy    2. CHF with left ventricular diastolic dysfunction, NYHA class 2    3. Non-ischemic cardiomyopathy         Plan:       Persistent NICM; likely peripartum.    S-ICD working well.  Return in 1 year with echo, or earlier prn.

## 2018-12-12 DIAGNOSIS — I49.9 CARDIAC ARRHYTHMIA, UNSPECIFIED CARDIAC ARRHYTHMIA TYPE: Primary | ICD-10-CM

## 2019-01-03 DIAGNOSIS — Z95.810 AICD (AUTOMATIC CARDIOVERTER/DEFIBRILLATOR) PRESENT: Primary | ICD-10-CM

## 2019-01-03 DIAGNOSIS — I42.8 NON-ISCHEMIC CARDIOMYOPATHY: ICD-10-CM

## 2019-01-14 ENCOUNTER — CLINICAL SUPPORT (OUTPATIENT)
Dept: CARDIOLOGY | Facility: HOSPITAL | Age: 34
End: 2019-01-14
Attending: INTERNAL MEDICINE
Payer: MEDICAID

## 2019-01-14 DIAGNOSIS — Z95.810 PRESENCE OF AUTOMATIC CARDIOVERTER/DEFIBRILLATOR (AICD): ICD-10-CM

## 2019-01-14 PROCEDURE — 93296 REM INTERROG EVL PM/IDS: CPT

## 2019-01-17 ENCOUNTER — TELEPHONE (OUTPATIENT)
Dept: CARDIOLOGY | Facility: HOSPITAL | Age: 34
End: 2019-01-17

## 2019-01-17 NOTE — TELEPHONE ENCOUNTER
Spoke with patient and informed her that I did not yet receive her remote transmission. She stated she will try to send again.   ----- Message from Marylou Powell sent at 1/17/2019  1:49 PM CST -----  Contact: Patient  The Pt is calling to see if her transmission came over? Please call her back @ [phone]. Thanks, Marylou

## 2019-04-05 ENCOUNTER — CLINICAL SUPPORT (OUTPATIENT)
Dept: CARDIOLOGY | Facility: HOSPITAL | Age: 34
End: 2019-04-05
Attending: INTERNAL MEDICINE
Payer: MEDICAID

## 2019-04-05 DIAGNOSIS — I42.8 NON-ISCHEMIC CARDIOMYOPATHY: ICD-10-CM

## 2019-04-05 DIAGNOSIS — Z95.810 AICD (AUTOMATIC CARDIOVERTER/DEFIBRILLATOR) PRESENT: ICD-10-CM

## 2019-04-05 PROCEDURE — 93282 PRGRMG EVAL IMPLANTABLE DFB: CPT

## 2019-05-30 ENCOUNTER — PATIENT MESSAGE (OUTPATIENT)
Dept: ELECTROPHYSIOLOGY | Facility: CLINIC | Age: 34
End: 2019-05-30

## 2019-07-08 ENCOUNTER — CLINICAL SUPPORT (OUTPATIENT)
Dept: CARDIOLOGY | Facility: HOSPITAL | Age: 34
End: 2019-07-08
Attending: INTERNAL MEDICINE
Payer: MEDICAID

## 2019-07-08 DIAGNOSIS — I42.8 NON-ISCHEMIC CARDIOMYOPATHY: ICD-10-CM

## 2019-07-08 DIAGNOSIS — Z95.810 AICD (AUTOMATIC CARDIOVERTER/DEFIBRILLATOR) PRESENT: ICD-10-CM

## 2019-07-08 PROCEDURE — 93296 REM INTERROG EVL PM/IDS: CPT

## 2019-07-11 ENCOUNTER — TELEPHONE (OUTPATIENT)
Dept: CARDIOLOGY | Facility: HOSPITAL | Age: 34
End: 2019-07-11

## 2019-07-11 NOTE — TELEPHONE ENCOUNTER
Patient was due to send her remote ICD transmission Tuesday. I called patient to remind her to send this. Transmission has been received.

## 2019-09-05 ENCOUNTER — CLINICAL SUPPORT (OUTPATIENT)
Dept: CARDIOLOGY | Facility: HOSPITAL | Age: 34
End: 2019-09-05
Payer: MEDICARE

## 2019-09-05 DIAGNOSIS — I42.9 CARDIOMYOPATHY, UNSPECIFIED: ICD-10-CM

## 2019-09-05 DIAGNOSIS — Z95.810 PRESENCE OF AUTOMATIC (IMPLANTABLE) CARDIAC DEFIBRILLATOR: ICD-10-CM

## 2019-09-05 PROCEDURE — 93296 REM INTERROG EVL PM/IDS: CPT | Performed by: INTERNAL MEDICINE

## 2019-12-04 ENCOUNTER — CLINICAL SUPPORT (OUTPATIENT)
Dept: CARDIOLOGY | Facility: HOSPITAL | Age: 34
End: 2019-12-04
Payer: MEDICARE

## 2019-12-04 DIAGNOSIS — Z95.810 AICD (AUTOMATIC CARDIOVERTER/DEFIBRILLATOR) PRESENT: ICD-10-CM

## 2019-12-04 DIAGNOSIS — I42.8 NON-ISCHEMIC CARDIOMYOPATHY: ICD-10-CM

## 2019-12-04 PROCEDURE — 93295 DEV INTERROG REMOTE 1/2/MLT: CPT | Mod: ,,, | Performed by: INTERNAL MEDICINE

## 2019-12-04 PROCEDURE — 93295 CARDIAC DEVICE CHECK - REMOTE: ICD-10-PCS | Mod: ,,, | Performed by: INTERNAL MEDICINE

## 2019-12-04 PROCEDURE — 93296 REM INTERROG EVL PM/IDS: CPT | Performed by: INTERNAL MEDICINE

## 2020-03-03 ENCOUNTER — CLINICAL SUPPORT (OUTPATIENT)
Dept: CARDIOLOGY | Facility: HOSPITAL | Age: 35
End: 2020-03-03
Payer: MEDICARE

## 2020-03-03 PROCEDURE — 93295 DEV INTERROG REMOTE 1/2/MLT: CPT | Mod: ,,, | Performed by: INTERNAL MEDICINE

## 2020-03-03 PROCEDURE — 93295 CARDIAC DEVICE CHECK - REMOTE: ICD-10-PCS | Mod: ,,, | Performed by: INTERNAL MEDICINE

## 2020-03-03 PROCEDURE — 93296 REM INTERROG EVL PM/IDS: CPT | Performed by: INTERNAL MEDICINE

## 2020-06-01 ENCOUNTER — CLINICAL SUPPORT (OUTPATIENT)
Dept: CARDIOLOGY | Facility: HOSPITAL | Age: 35
End: 2020-06-01
Payer: MEDICARE

## 2020-06-01 PROCEDURE — 93295 CARDIAC DEVICE CHECK - REMOTE: ICD-10-PCS | Mod: ,,, | Performed by: INTERNAL MEDICINE

## 2020-06-01 PROCEDURE — 93296 REM INTERROG EVL PM/IDS: CPT | Performed by: INTERNAL MEDICINE

## 2020-06-01 PROCEDURE — 93295 DEV INTERROG REMOTE 1/2/MLT: CPT | Mod: ,,, | Performed by: INTERNAL MEDICINE

## 2020-08-30 ENCOUNTER — CLINICAL SUPPORT (OUTPATIENT)
Dept: CARDIOLOGY | Facility: HOSPITAL | Age: 35
End: 2020-08-30
Payer: MEDICARE

## 2020-08-30 DIAGNOSIS — Z95.810 PRESENCE OF AUTOMATIC (IMPLANTABLE) CARDIAC DEFIBRILLATOR: ICD-10-CM

## 2020-08-30 PROCEDURE — 93296 REM INTERROG EVL PM/IDS: CPT | Performed by: INTERNAL MEDICINE

## 2020-08-30 PROCEDURE — 93295 CARDIAC DEVICE CHECK - REMOTE: ICD-10-PCS | Mod: ,,, | Performed by: INTERNAL MEDICINE

## 2020-08-30 PROCEDURE — 93295 DEV INTERROG REMOTE 1/2/MLT: CPT | Mod: ,,, | Performed by: INTERNAL MEDICINE

## 2020-11-28 ENCOUNTER — CLINICAL SUPPORT (OUTPATIENT)
Dept: CARDIOLOGY | Facility: HOSPITAL | Age: 35
End: 2020-11-28
Payer: MEDICARE

## 2020-11-28 DIAGNOSIS — Z95.810 PRESENCE OF AUTOMATIC (IMPLANTABLE) CARDIAC DEFIBRILLATOR: ICD-10-CM

## 2020-11-28 PROCEDURE — 93295 DEV INTERROG REMOTE 1/2/MLT: CPT | Mod: ,,, | Performed by: INTERNAL MEDICINE

## 2020-11-28 PROCEDURE — 93296 REM INTERROG EVL PM/IDS: CPT | Performed by: INTERNAL MEDICINE

## 2020-11-28 PROCEDURE — 93295 CARDIAC DEVICE CHECK - REMOTE: ICD-10-PCS | Mod: ,,, | Performed by: INTERNAL MEDICINE

## 2020-12-30 ENCOUNTER — TELEPHONE (OUTPATIENT)
Dept: CARDIOLOGY | Facility: HOSPITAL | Age: 35
End: 2020-12-30

## 2020-12-30 DIAGNOSIS — Z95.810 AICD (AUTOMATIC CARDIOVERTER/DEFIBRILLATOR) PRESENT: Primary | ICD-10-CM

## 2020-12-30 DIAGNOSIS — I42.8 NON-ISCHEMIC CARDIOMYOPATHY: ICD-10-CM

## 2020-12-30 NOTE — TELEPHONE ENCOUNTER
Patient returned phone call to discuss advisory and follow up care.Patient's S-ICD identified as part of an advisory announced by [employer] on 12/4/20.      Remote home monitor connected. Last remote interrogation: 11/18/20.     Arranged for in clinic visit on 1/13/21 @ 9:20 AM for device interrogation.     Patient denies any questions/concerns at this time.

## 2021-01-13 ENCOUNTER — CLINICAL SUPPORT (OUTPATIENT)
Dept: CARDIOLOGY | Facility: HOSPITAL | Age: 36
End: 2021-01-13
Attending: INTERNAL MEDICINE
Payer: MEDICARE

## 2021-01-13 ENCOUNTER — HOSPITAL ENCOUNTER (OUTPATIENT)
Dept: CARDIOLOGY | Facility: CLINIC | Age: 36
Discharge: HOME | End: 2021-01-13
Payer: MEDICARE

## 2021-01-13 ENCOUNTER — HOSPITAL ENCOUNTER (OUTPATIENT)
Dept: CARDIOLOGY | Facility: HOSPITAL | Age: 36
Discharge: HOME | End: 2021-01-13
Attending: INTERNAL MEDICINE
Payer: MEDICARE

## 2021-01-13 ENCOUNTER — OFFICE VISIT (OUTPATIENT)
Dept: ELECTROPHYSIOLOGY | Facility: CLINIC | Age: 36
End: 2021-01-13
Payer: MEDICARE

## 2021-01-13 VITALS
WEIGHT: 192.25 LBS | HEIGHT: 61 IN | HEART RATE: 65 BPM | DIASTOLIC BLOOD PRESSURE: 108 MMHG | SYSTOLIC BLOOD PRESSURE: 142 MMHG | BODY MASS INDEX: 36.3 KG/M2

## 2021-01-13 VITALS
HEIGHT: 61 IN | SYSTOLIC BLOOD PRESSURE: 142 MMHG | DIASTOLIC BLOOD PRESSURE: 108 MMHG | BODY MASS INDEX: 36.25 KG/M2 | WEIGHT: 192 LBS | HEART RATE: 82 BPM

## 2021-01-13 DIAGNOSIS — Z95.810 AICD (AUTOMATIC CARDIOVERTER/DEFIBRILLATOR) PRESENT: Primary | ICD-10-CM

## 2021-01-13 DIAGNOSIS — Z95.810 AICD (AUTOMATIC CARDIOVERTER/DEFIBRILLATOR) PRESENT: ICD-10-CM

## 2021-01-13 DIAGNOSIS — I50.30 CHF WITH LEFT VENTRICULAR DIASTOLIC DYSFUNCTION, NYHA CLASS 2: ICD-10-CM

## 2021-01-13 DIAGNOSIS — I42.8 NON-ISCHEMIC CARDIOMYOPATHY: ICD-10-CM

## 2021-01-13 LAB
ASCENDING AORTA: 2.84 CM
AV INDEX (PROSTH): 0.78
AV MEAN GRADIENT: 2 MMHG
AV PEAK GRADIENT: 4 MMHG
AV VALVE AREA: 3.01 CM2
AV VELOCITY RATIO: 0.79
BSA FOR ECHO PROCEDURE: 1.94 M2
CV ECHO LV RWT: 0.33 CM
DOP CALC AO PEAK VEL: 0.96 M/S
DOP CALC AO VTI: 17.05 CM
DOP CALC LVOT AREA: 3.9 CM2
DOP CALC LVOT DIAMETER: 2.22 CM
DOP CALC LVOT PEAK VEL: 0.76 M/S
DOP CALC LVOT STROKE VOLUME: 51.34 CM3
DOP CALCLVOT PEAK VEL VTI: 13.27 CM
E WAVE DECELERATION TIME: 214.52 MSEC
E/A RATIO: 0.87
E/E' RATIO: 6.83 M/S
ECHO LV POSTERIOR WALL: 0.9 CM (ref 0.6–1.1)
FRACTIONAL SHORTENING: 14 % (ref 28–44)
INTERVENTRICULAR SEPTUM: 0.8 CM (ref 0.6–1.1)
LA MAJOR: 3.74 CM
LA MINOR: 4.08 CM
LA WIDTH: 3.5 CM
LEFT ATRIUM SIZE: 3.6 CM
LEFT ATRIUM VOLUME INDEX MOD: 19 ML/M2
LEFT ATRIUM VOLUME INDEX: 22.5 ML/M2
LEFT ATRIUM VOLUME MOD: 35.36 CM3
LEFT ATRIUM VOLUME: 41.8 CM3
LEFT INTERNAL DIMENSION IN SYSTOLE: 4.71 CM (ref 2.1–4)
LEFT VENTRICLE DIASTOLIC VOLUME INDEX: 84.68 ML/M2
LEFT VENTRICLE DIASTOLIC VOLUME: 157.22 ML
LEFT VENTRICLE MASS INDEX: 93 G/M2
LEFT VENTRICLE SYSTOLIC VOLUME INDEX: 55.5 ML/M2
LEFT VENTRICLE SYSTOLIC VOLUME: 103.01 ML
LEFT VENTRICULAR INTERNAL DIMENSION IN DIASTOLE: 5.5 CM (ref 3.5–6)
LEFT VENTRICULAR MASS: 172.72 G
LV LATERAL E/E' RATIO: 6.83 M/S
LV SEPTAL E/E' RATIO: 6.83 M/S
MV PEAK A VEL: 0.47 M/S
MV PEAK E VEL: 0.41 M/S
RA MAJOR: 3.59 CM
RA PRESSURE: 3 MMHG
RA WIDTH: 3.05 CM
RIGHT VENTRICULAR END-DIASTOLIC DIMENSION: 3.18 CM
RV TISSUE DOPPLER FREE WALL SYSTOLIC VELOCITY 1 (APICAL 4 CHAMBER VIEW): 8.83 CM/S
SINUS: 3.24 CM
STJ: 2.66 CM
TDI LATERAL: 0.06 M/S
TDI SEPTAL: 0.06 M/S
TDI: 0.06 M/S
TRICUSPID ANNULAR PLANE SYSTOLIC EXCURSION: 2.01 CM

## 2021-01-13 PROCEDURE — 99999 PR PBB SHADOW E&M-EST. PATIENT-LVL III: ICD-10-PCS | Mod: PBBFAC,,, | Performed by: INTERNAL MEDICINE

## 2021-01-13 PROCEDURE — 99214 PR OFFICE/OUTPT VISIT, EST, LEVL IV, 30-39 MIN: ICD-10-PCS | Mod: S$GLB,,, | Performed by: INTERNAL MEDICINE

## 2021-01-13 PROCEDURE — C8929 TTE W OR WO FOL WCON,DOPPLER: HCPCS

## 2021-01-13 PROCEDURE — 25500020 PHARM REV CODE 255: Performed by: INTERNAL MEDICINE

## 2021-01-13 PROCEDURE — 93005 ELECTROCARDIOGRAM TRACING: CPT | Mod: S$GLB,,, | Performed by: INTERNAL MEDICINE

## 2021-01-13 PROCEDURE — 99999 PR PBB SHADOW E&M-EST. PATIENT-LVL III: CPT | Mod: PBBFAC,,, | Performed by: INTERNAL MEDICINE

## 2021-01-13 PROCEDURE — 93306 TTE W/DOPPLER COMPLETE: CPT | Mod: 26,,, | Performed by: INTERNAL MEDICINE

## 2021-01-13 PROCEDURE — 93010 ELECTROCARDIOGRAM REPORT: CPT | Mod: S$GLB,,, | Performed by: INTERNAL MEDICINE

## 2021-01-13 PROCEDURE — 93010 EKG 12-LEAD: ICD-10-PCS | Mod: S$GLB,,, | Performed by: INTERNAL MEDICINE

## 2021-01-13 PROCEDURE — 93005 EKG 12-LEAD: ICD-10-PCS | Mod: S$GLB,,, | Performed by: INTERNAL MEDICINE

## 2021-01-13 PROCEDURE — 93282 PRGRMG EVAL IMPLANTABLE DFB: CPT

## 2021-01-13 PROCEDURE — 99214 OFFICE O/P EST MOD 30 MIN: CPT | Mod: S$GLB,,, | Performed by: INTERNAL MEDICINE

## 2021-01-13 PROCEDURE — 93282 PRGRMG EVAL IMPLANTABLE DFB: CPT | Mod: 26,,, | Performed by: INTERNAL MEDICINE

## 2021-01-13 PROCEDURE — 93282 CARDIAC DEVICE CHECK - IN CLINIC & HOSPITAL: ICD-10-PCS | Mod: 26,,, | Performed by: INTERNAL MEDICINE

## 2021-01-13 PROCEDURE — 93306 ECHO (CUPID ONLY): ICD-10-PCS | Mod: 26,,, | Performed by: INTERNAL MEDICINE

## 2021-01-13 RX ORDER — AMLODIPINE BESYLATE 5 MG/1
5 TABLET ORAL DAILY
Qty: 30 TABLET | Refills: 11 | Status: SHIPPED | OUTPATIENT
Start: 2021-01-13

## 2021-01-13 RX ORDER — AZITHROMYCIN 250 MG/1
TABLET, FILM COATED ORAL
COMMUNITY
Start: 2021-01-04 | End: 2023-02-23 | Stop reason: CLARIF

## 2021-01-13 RX ADMIN — HUMAN ALBUMIN MICROSPHERES AND PERFLUTREN 0.11 MG: 10; .22 INJECTION, SOLUTION INTRAVENOUS at 11:01

## 2021-02-26 ENCOUNTER — CLINICAL SUPPORT (OUTPATIENT)
Dept: CARDIOLOGY | Facility: HOSPITAL | Age: 36
End: 2021-02-26
Payer: MEDICARE

## 2021-02-26 DIAGNOSIS — I42.5 OTHER RESTRICTIVE CARDIOMYOPATHY: ICD-10-CM

## 2021-02-26 DIAGNOSIS — Z95.810 PRESENCE OF AUTOMATIC (IMPLANTABLE) CARDIAC DEFIBRILLATOR: ICD-10-CM

## 2021-02-26 PROCEDURE — 93295 CARDIAC DEVICE CHECK - REMOTE: ICD-10-PCS | Mod: ,,, | Performed by: INTERNAL MEDICINE

## 2021-02-26 PROCEDURE — 93295 DEV INTERROG REMOTE 1/2/MLT: CPT | Mod: ,,, | Performed by: INTERNAL MEDICINE

## 2021-02-26 PROCEDURE — 93296 REM INTERROG EVL PM/IDS: CPT | Performed by: INTERNAL MEDICINE

## 2021-05-27 ENCOUNTER — CLINICAL SUPPORT (OUTPATIENT)
Dept: CARDIOLOGY | Facility: HOSPITAL | Age: 36
End: 2021-05-27
Payer: MEDICARE

## 2021-05-27 DIAGNOSIS — Z95.810 PRESENCE OF AUTOMATIC (IMPLANTABLE) CARDIAC DEFIBRILLATOR: ICD-10-CM

## 2021-05-27 PROCEDURE — 93296 REM INTERROG EVL PM/IDS: CPT | Performed by: INTERNAL MEDICINE

## 2021-05-27 PROCEDURE — 93295 CARDIAC DEVICE CHECK - REMOTE: ICD-10-PCS | Mod: ,,, | Performed by: INTERNAL MEDICINE

## 2021-05-27 PROCEDURE — 93295 DEV INTERROG REMOTE 1/2/MLT: CPT | Mod: ,,, | Performed by: INTERNAL MEDICINE

## 2021-08-25 ENCOUNTER — CLINICAL SUPPORT (OUTPATIENT)
Dept: CARDIOLOGY | Facility: HOSPITAL | Age: 36
End: 2021-08-25
Payer: MEDICARE

## 2021-08-25 DIAGNOSIS — Z95.810 PRESENCE OF AUTOMATIC (IMPLANTABLE) CARDIAC DEFIBRILLATOR: ICD-10-CM

## 2021-08-25 PROCEDURE — 93295 CARDIAC DEVICE CHECK - REMOTE: ICD-10-PCS | Mod: ,,, | Performed by: INTERNAL MEDICINE

## 2021-08-25 PROCEDURE — 93296 REM INTERROG EVL PM/IDS: CPT | Performed by: INTERNAL MEDICINE

## 2021-08-25 PROCEDURE — 93295 DEV INTERROG REMOTE 1/2/MLT: CPT | Mod: ,,, | Performed by: INTERNAL MEDICINE

## 2021-10-18 ENCOUNTER — PATIENT MESSAGE (OUTPATIENT)
Dept: TRANSPLANT | Facility: CLINIC | Age: 36
End: 2021-10-18
Payer: MEDICARE

## 2021-11-23 ENCOUNTER — CLINICAL SUPPORT (OUTPATIENT)
Dept: CARDIOLOGY | Facility: HOSPITAL | Age: 36
End: 2021-11-23
Payer: MEDICARE

## 2021-11-23 DIAGNOSIS — Z95.810 PRESENCE OF AUTOMATIC (IMPLANTABLE) CARDIAC DEFIBRILLATOR: ICD-10-CM

## 2021-11-23 PROCEDURE — 93295 CARDIAC DEVICE CHECK - REMOTE: ICD-10-PCS | Mod: ,,, | Performed by: INTERNAL MEDICINE

## 2021-11-23 PROCEDURE — 93296 REM INTERROG EVL PM/IDS: CPT | Performed by: INTERNAL MEDICINE

## 2021-11-23 PROCEDURE — 93295 DEV INTERROG REMOTE 1/2/MLT: CPT | Mod: ,,, | Performed by: INTERNAL MEDICINE

## 2022-01-04 ENCOUNTER — TELEPHONE (OUTPATIENT)
Dept: ELECTROPHYSIOLOGY | Facility: CLINIC | Age: 37
End: 2022-01-04
Payer: MEDICARE

## 2022-01-04 ENCOUNTER — PATIENT MESSAGE (OUTPATIENT)
Dept: ELECTROPHYSIOLOGY | Facility: CLINIC | Age: 37
End: 2022-01-04
Payer: MEDICARE

## 2022-01-04 DIAGNOSIS — I42.8 NON-ISCHEMIC CARDIOMYOPATHY: Primary | ICD-10-CM

## 2022-01-04 DIAGNOSIS — Z95.810 AICD (AUTOMATIC CARDIOVERTER/DEFIBRILLATOR) PRESENT: ICD-10-CM

## 2022-01-04 NOTE — TELEPHONE ENCOUNTER
See email sent to pt.        Ms. Delgadilloling,  I see you have an appointment in March to see Dr. Serrano. It will be time to check your defibrillator as well.  I scheduled your defibrillator check for 9:00 am.    If this is a problem, or you have a time conflict, please call our clinic at [phone].    Sincerely,  [facility]  [phone]

## 2022-02-17 DIAGNOSIS — I50.30 CHF WITH LEFT VENTRICULAR DIASTOLIC DYSFUNCTION, NYHA CLASS 2: Primary | ICD-10-CM

## 2022-02-17 DIAGNOSIS — I42.8 NON-ISCHEMIC CARDIOMYOPATHY: ICD-10-CM

## 2022-02-21 ENCOUNTER — CLINICAL SUPPORT (OUTPATIENT)
Dept: CARDIOLOGY | Facility: HOSPITAL | Age: 37
End: 2022-02-21
Payer: MEDICARE

## 2022-02-21 DIAGNOSIS — Z95.810 PRESENCE OF AUTOMATIC (IMPLANTABLE) CARDIAC DEFIBRILLATOR: ICD-10-CM

## 2022-02-21 PROCEDURE — 93296 REM INTERROG EVL PM/IDS: CPT | Performed by: INTERNAL MEDICINE

## 2022-03-02 ENCOUNTER — CLINICAL SUPPORT (OUTPATIENT)
Dept: CARDIOLOGY | Facility: HOSPITAL | Age: 37
End: 2022-03-02
Attending: INTERNAL MEDICINE
Payer: MEDICARE

## 2022-03-02 ENCOUNTER — OFFICE VISIT (OUTPATIENT)
Dept: ELECTROPHYSIOLOGY | Facility: CLINIC | Age: 37
End: 2022-03-02
Payer: MEDICARE

## 2022-03-02 ENCOUNTER — HOSPITAL ENCOUNTER (OUTPATIENT)
Dept: CARDIOLOGY | Facility: CLINIC | Age: 37
Discharge: HOME | End: 2022-03-02
Payer: MEDICARE

## 2022-03-02 VITALS
SYSTOLIC BLOOD PRESSURE: 138 MMHG | BODY MASS INDEX: 36.63 KG/M2 | HEIGHT: 61 IN | DIASTOLIC BLOOD PRESSURE: 86 MMHG | WEIGHT: 194 LBS | HEART RATE: 78 BPM

## 2022-03-02 DIAGNOSIS — Z95.810 AICD (AUTOMATIC CARDIOVERTER/DEFIBRILLATOR) PRESENT: ICD-10-CM

## 2022-03-02 DIAGNOSIS — I42.8 NON-ISCHEMIC CARDIOMYOPATHY: ICD-10-CM

## 2022-03-02 DIAGNOSIS — I50.30 CHF WITH LEFT VENTRICULAR DIASTOLIC DYSFUNCTION, NYHA CLASS 2: Primary | ICD-10-CM

## 2022-03-02 PROCEDURE — 99999 PR PBB SHADOW E&M-EST. PATIENT-LVL III: CPT | Mod: PBBFAC,,, | Performed by: INTERNAL MEDICINE

## 2022-03-02 PROCEDURE — 93260 PRGRMG DEV EVAL IMPLTBL SYS: CPT | Mod: 26,,, | Performed by: INTERNAL MEDICINE

## 2022-03-02 PROCEDURE — 3008F PR BODY MASS INDEX (BMI) DOCUMENTED: ICD-10-PCS | Mod: CPTII,S$GLB,, | Performed by: INTERNAL MEDICINE

## 2022-03-02 PROCEDURE — 93005 RHYTHM STRIP: ICD-10-PCS | Mod: S$GLB,,, | Performed by: INTERNAL MEDICINE

## 2022-03-02 PROCEDURE — 99999 PR PBB SHADOW E&M-EST. PATIENT-LVL III: ICD-10-PCS | Mod: PBBFAC,,, | Performed by: INTERNAL MEDICINE

## 2022-03-02 PROCEDURE — 93005 ELECTROCARDIOGRAM TRACING: CPT | Mod: S$GLB,,, | Performed by: INTERNAL MEDICINE

## 2022-03-02 PROCEDURE — 99214 OFFICE O/P EST MOD 30 MIN: CPT | Mod: S$GLB,,, | Performed by: INTERNAL MEDICINE

## 2022-03-02 PROCEDURE — 3075F PR MOST RECENT SYSTOLIC BLOOD PRESS GE 130-139MM HG: ICD-10-PCS | Mod: CPTII,S$GLB,, | Performed by: INTERNAL MEDICINE

## 2022-03-02 PROCEDURE — 99214 PR OFFICE/OUTPT VISIT, EST, LEVL IV, 30-39 MIN: ICD-10-PCS | Mod: S$GLB,,, | Performed by: INTERNAL MEDICINE

## 2022-03-02 PROCEDURE — 93010 ELECTROCARDIOGRAM REPORT: CPT | Mod: S$GLB,,, | Performed by: INTERNAL MEDICINE

## 2022-03-02 PROCEDURE — 93260 PRGRMG DEV EVAL IMPLTBL SYS: CPT

## 2022-03-02 PROCEDURE — 3075F SYST BP GE 130 - 139MM HG: CPT | Mod: CPTII,S$GLB,, | Performed by: INTERNAL MEDICINE

## 2022-03-02 PROCEDURE — 3008F BODY MASS INDEX DOCD: CPT | Mod: CPTII,S$GLB,, | Performed by: INTERNAL MEDICINE

## 2022-03-02 PROCEDURE — 3079F DIAST BP 80-89 MM HG: CPT | Mod: CPTII,S$GLB,, | Performed by: INTERNAL MEDICINE

## 2022-03-02 PROCEDURE — 93010 RHYTHM STRIP: ICD-10-PCS | Mod: S$GLB,,, | Performed by: INTERNAL MEDICINE

## 2022-03-02 PROCEDURE — 93260 CARDIAC DEVICE CHECK - IN CLINIC & HOSPITAL: ICD-10-PCS | Mod: 26,,, | Performed by: INTERNAL MEDICINE

## 2022-03-02 PROCEDURE — 3079F PR MOST RECENT DIASTOLIC BLOOD PRESSURE 80-89 MM HG: ICD-10-PCS | Mod: CPTII,S$GLB,, | Performed by: INTERNAL MEDICINE

## 2022-03-02 PROCEDURE — 1159F MED LIST DOCD IN RCRD: CPT | Mod: CPTII,S$GLB,, | Performed by: INTERNAL MEDICINE

## 2022-03-02 PROCEDURE — 1159F PR MEDICATION LIST DOCUMENTED IN MEDICAL RECORD: ICD-10-PCS | Mod: CPTII,S$GLB,, | Performed by: INTERNAL MEDICINE

## 2022-03-02 RX ORDER — FAMOTIDINE 20 MG/1
TABLET, FILM COATED ORAL
COMMUNITY
Start: 2021-11-27

## 2022-03-02 RX ORDER — IPRATROPIUM BROMIDE AND ALBUTEROL SULFATE 2.5; .5 MG/3ML; MG/3ML
3 SOLUTION RESPIRATORY (INHALATION) EVERY 6 HOURS PRN
COMMUNITY
Start: 2021-12-14

## 2022-03-02 RX ORDER — SPIRONOLACTONE 50 MG/1
50 TABLET, FILM COATED ORAL DAILY
Qty: 90 TABLET | Refills: 3 | Status: SHIPPED | OUTPATIENT
Start: 2022-03-02

## 2022-03-02 NOTE — PROGRESS NOTES
"Subjective:    Patient ID:  Cj Silva is a 36 y.o. female who presents for evaluation of No chief complaint on file.    "Stasha" (long A sound)    HPI   36 y.o. F lionel from [place]  HTN  peripartum CM (dx'd 6 mos post delivery of her most recent child [she has 4 kids]).    Now has NYHA II-III sx.  LH on occasion, but no syncope.    brother suffered SCD at age 33.  CPX: HR to 162 bpm. No ischemic changes on ECG.    S-ICD implanted. Working well. No events.    My interpretation of today's ECG is NSR. Narrow QRSd.    Review of Systems   Constitutional: Negative. Negative for malaise/fatigue.   HENT: Negative.  Negative for ear pain and tinnitus.    Eyes: Negative for blurred vision.   Cardiovascular: Positive for dyspnea on exertion. Negative for chest pain, near-syncope, palpitations and syncope.   Respiratory: Positive for shortness of breath.    Endocrine: Negative.  Negative for polyuria.   Hematologic/Lymphatic: Does not bruise/bleed easily.   Skin: Negative.  Negative for rash.   Musculoskeletal: Negative.  Negative for joint pain and muscle weakness.   Gastrointestinal: Negative.  Negative for abdominal pain and change in bowel habit.   Genitourinary: Negative for frequency.   Neurological: Negative.  Negative for dizziness and weakness.   Psychiatric/Behavioral: Negative.  Negative for depression. The patient is not nervous/anxious.    Allergic/Immunologic: Negative for environmental allergies.        Objective:    Physical Exam  Vitals and nursing note reviewed.   Constitutional:       Appearance: Normal appearance. She is well-developed.   HENT:      Head: Normocephalic and atraumatic.   Eyes:      Conjunctiva/sclera: Conjunctivae normal.   Neck:      Thyroid: No thyroid mass or thyromegaly.      Vascular: No carotid bruit.      Trachea: No tracheal deviation.   Cardiovascular:      Rate and Rhythm: Normal rate and regular rhythm.  No extrasystoles are present.     Chest Wall: PMI is not " displaced.      Pulses: Normal pulses and intact distal pulses.      Heart sounds: Normal heart sounds. No murmur heard.    No friction rub. No gallop.   Pulmonary:      Effort: Pulmonary effort is normal. No respiratory distress.      Breath sounds: Normal breath sounds. No wheezing or rales.   Abdominal:      General: There is no distension.      Palpations: Abdomen is soft.   Musculoskeletal:         General: Normal range of motion.      Cervical back: Normal range of motion. No edema.   Skin:     General: Skin is warm and dry.      Findings: No rash.   Neurological:      Mental Status: She is alert and oriented to person, place, and time.      Coordination: Coordination normal.   Psychiatric:         Speech: Speech normal.         Behavior: Behavior normal. Behavior is cooperative.         Thought Content: Thought content normal.           Assessment:       1. CHF with left ventricular diastolic dysfunction, NYHA class 2    2. Non-ischemic cardiomyopathy    3. Peripartum cardiomyopathy         Plan:       Persistent NICM; likely peripartum.    S-ICD working well.  Increase aldactone 25 -> 50  Return in 1 year, or earlier prn.

## 2022-05-22 ENCOUNTER — CLINICAL SUPPORT (OUTPATIENT)
Dept: CARDIOLOGY | Facility: HOSPITAL | Age: 37
End: 2022-05-22
Payer: MEDICARE

## 2022-05-22 DIAGNOSIS — Z95.810 PRESENCE OF AUTOMATIC (IMPLANTABLE) CARDIAC DEFIBRILLATOR: ICD-10-CM

## 2022-05-22 DIAGNOSIS — I50.9 HEART FAILURE, UNSPECIFIED: ICD-10-CM

## 2022-05-22 DIAGNOSIS — I42.9 CARDIOMYOPATHY, UNSPECIFIED: ICD-10-CM

## 2022-05-22 PROCEDURE — 93296 REM INTERROG EVL PM/IDS: CPT | Performed by: INTERNAL MEDICINE

## 2022-05-22 PROCEDURE — 93295 DEV INTERROG REMOTE 1/2/MLT: CPT | Mod: ,,, | Performed by: INTERNAL MEDICINE

## 2022-05-22 PROCEDURE — 93295 CARDIAC DEVICE CHECK - REMOTE: ICD-10-PCS | Mod: ,,, | Performed by: INTERNAL MEDICINE

## 2022-09-21 ENCOUNTER — PATIENT MESSAGE (OUTPATIENT)
Dept: CARDIOLOGY | Facility: HOSPITAL | Age: 37
End: 2022-09-21
Payer: MEDICARE

## 2022-09-29 ENCOUNTER — CLINICAL SUPPORT (OUTPATIENT)
Dept: CARDIOLOGY | Facility: HOSPITAL | Age: 37
End: 2022-09-29
Payer: MEDICARE

## 2022-09-29 DIAGNOSIS — I50.9 HEART FAILURE, UNSPECIFIED: ICD-10-CM

## 2022-09-29 DIAGNOSIS — Z95.810 PRESENCE OF AUTOMATIC (IMPLANTABLE) CARDIAC DEFIBRILLATOR: ICD-10-CM

## 2022-09-29 DIAGNOSIS — I42.9 CARDIOMYOPATHY, UNSPECIFIED: ICD-10-CM

## 2022-09-29 PROCEDURE — 93296 REM INTERROG EVL PM/IDS: CPT | Performed by: INTERNAL MEDICINE

## 2022-09-29 PROCEDURE — 93295 CARDIAC DEVICE CHECK - REMOTE: ICD-10-PCS | Mod: ,,, | Performed by: INTERNAL MEDICINE

## 2022-09-29 PROCEDURE — 93295 DEV INTERROG REMOTE 1/2/MLT: CPT | Mod: ,,, | Performed by: INTERNAL MEDICINE

## 2023-01-05 ENCOUNTER — CLINICAL SUPPORT (OUTPATIENT)
Dept: CARDIOLOGY | Facility: HOSPITAL | Age: 38
End: 2023-01-05
Attending: INTERNAL MEDICINE
Payer: MEDICARE

## 2023-01-05 DIAGNOSIS — Z95.810 PRESENCE OF AUTOMATIC (IMPLANTABLE) CARDIAC DEFIBRILLATOR: ICD-10-CM

## 2023-01-05 PROCEDURE — 93295 CARDIAC DEVICE CHECK - REMOTE: ICD-10-PCS | Mod: ,,, | Performed by: INTERNAL MEDICINE

## 2023-01-05 PROCEDURE — 93295 DEV INTERROG REMOTE 1/2/MLT: CPT | Mod: ,,, | Performed by: INTERNAL MEDICINE

## 2023-01-05 PROCEDURE — 93296 REM INTERROG EVL PM/IDS: CPT | Performed by: INTERNAL MEDICINE

## 2023-04-05 ENCOUNTER — CLINICAL SUPPORT (OUTPATIENT)
Dept: CARDIOLOGY | Facility: HOSPITAL | Age: 38
End: 2023-04-05
Payer: MEDICARE

## 2023-04-05 DIAGNOSIS — Z95.810 PRESENCE OF AUTOMATIC (IMPLANTABLE) CARDIAC DEFIBRILLATOR: ICD-10-CM

## 2023-04-05 PROCEDURE — 93296 REM INTERROG EVL PM/IDS: CPT | Performed by: INTERNAL MEDICINE

## 2023-05-05 DIAGNOSIS — I50.30 CHF WITH LEFT VENTRICULAR DIASTOLIC DYSFUNCTION, NYHA CLASS 2: Primary | ICD-10-CM

## 2023-05-12 ENCOUNTER — HOSPITAL ENCOUNTER (OUTPATIENT)
Dept: CARDIOLOGY | Facility: CLINIC | Age: 38
Discharge: HOME | End: 2023-05-12
Attending: INTERNAL MEDICINE
Payer: MEDICARE

## 2023-05-12 ENCOUNTER — CLINICAL SUPPORT (OUTPATIENT)
Dept: CARDIOLOGY | Facility: HOSPITAL | Age: 38
End: 2023-05-12
Attending: INTERNAL MEDICINE
Payer: MEDICARE

## 2023-05-12 ENCOUNTER — OFFICE VISIT (OUTPATIENT)
Dept: ELECTROPHYSIOLOGY | Facility: CLINIC | Age: 38
End: 2023-05-12
Payer: MEDICARE

## 2023-05-12 VITALS
SYSTOLIC BLOOD PRESSURE: 108 MMHG | DIASTOLIC BLOOD PRESSURE: 75 MMHG | HEART RATE: 64 BPM | BODY MASS INDEX: 38.69 KG/M2 | HEIGHT: 60 IN | WEIGHT: 197.06 LBS

## 2023-05-12 DIAGNOSIS — I50.30 CHF WITH LEFT VENTRICULAR DIASTOLIC DYSFUNCTION, NYHA CLASS 2: ICD-10-CM

## 2023-05-12 DIAGNOSIS — I42.8 NON-ISCHEMIC CARDIOMYOPATHY: ICD-10-CM

## 2023-05-12 DIAGNOSIS — I27.20 HYPERTENSIVE PULMONARY VASCULAR DISEASE: ICD-10-CM

## 2023-05-12 DIAGNOSIS — E66.01 SEVERE OBESITY (BMI 35.0-39.9) WITH COMORBIDITY: Primary | ICD-10-CM

## 2023-05-12 PROCEDURE — 1160F PR REVIEW ALL MEDS BY PRESCRIBER/CLIN PHARMACIST DOCUMENTED: ICD-10-PCS | Mod: CPTII,S$GLB,, | Performed by: INTERNAL MEDICINE

## 2023-05-12 PROCEDURE — 99214 OFFICE O/P EST MOD 30 MIN: CPT | Mod: S$GLB,,, | Performed by: INTERNAL MEDICINE

## 2023-05-12 PROCEDURE — 1159F PR MEDICATION LIST DOCUMENTED IN MEDICAL RECORD: ICD-10-PCS | Mod: CPTII,S$GLB,, | Performed by: INTERNAL MEDICINE

## 2023-05-12 PROCEDURE — 3008F BODY MASS INDEX DOCD: CPT | Mod: CPTII,S$GLB,, | Performed by: INTERNAL MEDICINE

## 2023-05-12 PROCEDURE — 99214 PR OFFICE/OUTPT VISIT, EST, LEVL IV, 30-39 MIN: ICD-10-PCS | Mod: S$GLB,,, | Performed by: INTERNAL MEDICINE

## 2023-05-12 PROCEDURE — 93010 RHYTHM STRIP: ICD-10-PCS | Mod: S$GLB,,, | Performed by: INTERNAL MEDICINE

## 2023-05-12 PROCEDURE — 93282 PRGRMG EVAL IMPLANTABLE DFB: CPT

## 2023-05-12 PROCEDURE — 93005 RHYTHM STRIP: ICD-10-PCS | Mod: S$GLB,,, | Performed by: INTERNAL MEDICINE

## 2023-05-12 PROCEDURE — 3078F DIAST BP <80 MM HG: CPT | Mod: CPTII,S$GLB,, | Performed by: INTERNAL MEDICINE

## 2023-05-12 PROCEDURE — 93010 ELECTROCARDIOGRAM REPORT: CPT | Mod: S$GLB,,, | Performed by: INTERNAL MEDICINE

## 2023-05-12 PROCEDURE — 93005 ELECTROCARDIOGRAM TRACING: CPT | Mod: S$GLB,,, | Performed by: INTERNAL MEDICINE

## 2023-05-12 PROCEDURE — 93282 PRGRMG EVAL IMPLANTABLE DFB: CPT | Mod: 26,,, | Performed by: INTERNAL MEDICINE

## 2023-05-12 PROCEDURE — 1159F MED LIST DOCD IN RCRD: CPT | Mod: CPTII,S$GLB,, | Performed by: INTERNAL MEDICINE

## 2023-05-12 PROCEDURE — 3078F PR MOST RECENT DIASTOLIC BLOOD PRESSURE < 80 MM HG: ICD-10-PCS | Mod: CPTII,S$GLB,, | Performed by: INTERNAL MEDICINE

## 2023-05-12 PROCEDURE — 93282 CARDIAC DEVICE CHECK - IN CLINIC & HOSPITAL: ICD-10-PCS | Mod: 26,,, | Performed by: INTERNAL MEDICINE

## 2023-05-12 PROCEDURE — 3008F PR BODY MASS INDEX (BMI) DOCUMENTED: ICD-10-PCS | Mod: CPTII,S$GLB,, | Performed by: INTERNAL MEDICINE

## 2023-05-12 PROCEDURE — 3074F PR MOST RECENT SYSTOLIC BLOOD PRESSURE < 130 MM HG: ICD-10-PCS | Mod: CPTII,S$GLB,, | Performed by: INTERNAL MEDICINE

## 2023-05-12 PROCEDURE — 3074F SYST BP LT 130 MM HG: CPT | Mod: CPTII,S$GLB,, | Performed by: INTERNAL MEDICINE

## 2023-05-12 PROCEDURE — 99999 PR PBB SHADOW E&M-EST. PATIENT-LVL III: CPT | Mod: PBBFAC,,, | Performed by: INTERNAL MEDICINE

## 2023-05-12 PROCEDURE — 1160F RVW MEDS BY RX/DR IN RCRD: CPT | Mod: CPTII,S$GLB,, | Performed by: INTERNAL MEDICINE

## 2023-05-12 PROCEDURE — 99999 PR PBB SHADOW E&M-EST. PATIENT-LVL III: ICD-10-PCS | Mod: PBBFAC,,, | Performed by: INTERNAL MEDICINE

## 2023-05-12 NOTE — PROGRESS NOTES
"Subjective:    Patient ID:  Cj Silva is a 37 y.o. female who presents for evaluation of NICM     "Stasha" (long A sound)    HPI   37 y.o. F  from [place]  HTN  peripartum CM (dx'd 6 mos post delivery of her most recent child [she has 4 kids]).    Now has NYHA II-III sx.  LH on occasion, but no syncope.    brother suffered SCD at age 33.  CPX: HR to 162 bpm. No ischemic changes on ECG.    S-ICD implanted. Working well. No events.    My interpretation of today's ECG is NSR. Narrow QRSd.    Review of Systems   Constitutional: Negative. Negative for malaise/fatigue.   HENT: Negative.  Negative for ear pain and tinnitus.    Eyes:  Negative for blurred vision.   Cardiovascular:  Positive for dyspnea on exertion. Negative for chest pain, near-syncope, palpitations and syncope.   Respiratory:  Positive for shortness of breath.    Endocrine: Negative.  Negative for polyuria.   Hematologic/Lymphatic: Does not bruise/bleed easily.   Skin: Negative.  Negative for rash.   Musculoskeletal: Negative.  Negative for joint pain and muscle weakness.   Gastrointestinal: Negative.  Negative for abdominal pain and change in bowel habit.   Genitourinary:  Negative for frequency.   Neurological: Negative.  Negative for dizziness and weakness.   Psychiatric/Behavioral: Negative.  Negative for depression. The patient is not nervous/anxious.    Allergic/Immunologic: Negative for environmental allergies.      Objective:    Physical Exam  Vitals and nursing note reviewed.   Constitutional:       Appearance: Normal appearance. She is well-developed.   HENT:      Head: Normocephalic and atraumatic.   Eyes:      Conjunctiva/sclera: Conjunctivae normal.   Neck:      Thyroid: No thyroid mass or thyromegaly.      Vascular: No carotid bruit.      Trachea: No tracheal deviation.   Cardiovascular:      Rate and Rhythm: Normal rate and regular rhythm. No extrasystoles are present.     Chest Wall: PMI is not displaced.      Pulses: " Normal pulses and intact distal pulses.      Heart sounds: Normal heart sounds. No murmur heard.    No friction rub. No gallop.   Pulmonary:      Effort: Pulmonary effort is normal. No respiratory distress.      Breath sounds: Normal breath sounds. No wheezing or rales.   Abdominal:      General: There is no distension.      Palpations: Abdomen is soft.   Musculoskeletal:         General: Normal range of motion.      Cervical back: Normal range of motion. No edema.   Skin:     General: Skin is warm and dry.      Findings: No rash.   Neurological:      Mental Status: She is alert and oriented to person, place, and time.      Coordination: Coordination normal.   Psychiatric:         Speech: Speech normal.         Behavior: Behavior normal. Behavior is cooperative.         Thought Content: Thought content normal.         Assessment:       1. Severe obesity (BMI 35.0-39.9) with comorbidity    2. Non-ischemic cardiomyopathy    3. Hypertensive pulmonary vascular disease    4. CHF with left ventricular diastolic dysfunction, NYHA class 2         Plan:       Persistent NICM; likely peripartum.    S-ICD working well.  Return in 1 year with echo, or earlier prn.

## 2023-07-12 ENCOUNTER — CLINICAL SUPPORT (OUTPATIENT)
Dept: CARDIOLOGY | Facility: HOSPITAL | Age: 38
End: 2023-07-12
Payer: MEDICARE

## 2023-07-12 DIAGNOSIS — Z95.810 PRESENCE OF AUTOMATIC (IMPLANTABLE) CARDIAC DEFIBRILLATOR: ICD-10-CM

## 2023-07-12 DIAGNOSIS — I42.8 OTHER CARDIOMYOPATHIES: ICD-10-CM

## 2023-07-12 DIAGNOSIS — I50.9 HEART FAILURE, UNSPECIFIED: ICD-10-CM

## 2023-07-12 PROCEDURE — 93295 DEV INTERROG REMOTE 1/2/MLT: CPT | Mod: ,,, | Performed by: INTERNAL MEDICINE

## 2023-07-12 PROCEDURE — 93295 CARDIAC DEVICE CHECK - REMOTE: ICD-10-PCS | Mod: ,,, | Performed by: INTERNAL MEDICINE

## 2023-07-12 PROCEDURE — 93296 REM INTERROG EVL PM/IDS: CPT | Performed by: INTERNAL MEDICINE

## 2023-09-20 ENCOUNTER — TELEPHONE (OUTPATIENT)
Dept: ELECTROPHYSIOLOGY | Facility: CLINIC | Age: 38
End: 2023-09-20
Payer: MEDICARE

## 2023-10-10 ENCOUNTER — CLINICAL SUPPORT (OUTPATIENT)
Dept: CARDIOLOGY | Facility: HOSPITAL | Age: 38
End: 2023-10-10
Payer: MEDICARE

## 2023-10-10 DIAGNOSIS — Z95.810 PRESENCE OF AUTOMATIC (IMPLANTABLE) CARDIAC DEFIBRILLATOR: ICD-10-CM

## 2023-10-10 PROCEDURE — 93296 REM INTERROG EVL PM/IDS: CPT | Performed by: INTERNAL MEDICINE

## 2023-10-30 ENCOUNTER — PATIENT MESSAGE (OUTPATIENT)
Dept: ELECTROPHYSIOLOGY | Facility: CLINIC | Age: 38
End: 2023-10-30
Payer: MEDICARE

## 2024-01-09 ENCOUNTER — CLINICAL SUPPORT (OUTPATIENT)
Dept: CARDIOLOGY | Facility: HOSPITAL | Age: 39
End: 2024-01-09
Payer: MEDICARE

## 2024-01-09 ENCOUNTER — CLINICAL SUPPORT (OUTPATIENT)
Dept: CARDIOLOGY | Facility: HOSPITAL | Age: 39
End: 2024-01-09
Attending: INTERNAL MEDICINE
Payer: MEDICARE

## 2024-01-09 DIAGNOSIS — I50.9 HEART FAILURE, UNSPECIFIED: ICD-10-CM

## 2024-01-09 PROCEDURE — 93296 REM INTERROG EVL PM/IDS: CPT | Performed by: INTERNAL MEDICINE

## 2024-01-09 PROCEDURE — 93295 DEV INTERROG REMOTE 1/2/MLT: CPT | Mod: ,,, | Performed by: INTERNAL MEDICINE

## 2024-01-12 ENCOUNTER — TELEPHONE (OUTPATIENT)
Dept: CARDIOLOGY | Facility: HOSPITAL | Age: 39
End: 2024-01-12
Payer: MEDICARE

## 2024-01-12 LAB
OHS CV AF BURDEN PERCENT: < 1
OHS CV DC REMOTE DEVICE TYPE: NORMAL

## 2024-01-12 NOTE — TELEPHONE ENCOUNTER
Returned patients call, no answer, unable to leave a voice message.     ----- Message from Noel RUPINDER Whaley sent at 1/12/2024  3:51 PM CST -----  Regarding: FW: Pain    ----- Message -----  From: Dorothy Kiran  Sent: 1/12/2024   3:47 PM CST  To: Zach SADLER Staff  Subject: Pain                                             Patient experiencing pain around device and can't explain it. Please call back @ [phone]. Thank you Dorothy

## 2024-04-09 ENCOUNTER — CLINICAL SUPPORT (OUTPATIENT)
Dept: CARDIOLOGY | Facility: HOSPITAL | Age: 39
End: 2024-04-09
Payer: MEDICARE

## 2024-04-09 ENCOUNTER — CLINICAL SUPPORT (OUTPATIENT)
Dept: CARDIOLOGY | Facility: HOSPITAL | Age: 39
End: 2024-04-09
Attending: INTERNAL MEDICINE
Payer: MEDICARE

## 2024-04-09 DIAGNOSIS — I50.9 HEART FAILURE, UNSPECIFIED: ICD-10-CM

## 2024-04-09 PROCEDURE — 93295 DEV INTERROG REMOTE 1/2/MLT: CPT | Mod: ,,, | Performed by: INTERNAL MEDICINE

## 2024-04-09 PROCEDURE — 93296 REM INTERROG EVL PM/IDS: CPT | Performed by: INTERNAL MEDICINE

## 2024-04-10 LAB
OHS CV AF BURDEN PERCENT: < 1
OHS CV DC REMOTE DEVICE TYPE: NORMAL

## 2024-07-15 ENCOUNTER — CLINICAL SUPPORT (OUTPATIENT)
Dept: CARDIOLOGY | Facility: HOSPITAL | Age: 39
End: 2024-07-15
Attending: INTERNAL MEDICINE
Payer: MEDICARE

## 2024-07-15 ENCOUNTER — CLINICAL SUPPORT (OUTPATIENT)
Dept: CARDIOLOGY | Facility: HOSPITAL | Age: 39
End: 2024-07-15
Payer: MEDICARE

## 2024-07-15 DIAGNOSIS — I50.9 HEART FAILURE, UNSPECIFIED: ICD-10-CM

## 2024-07-15 PROCEDURE — 93296 REM INTERROG EVL PM/IDS: CPT | Performed by: INTERNAL MEDICINE

## 2024-07-15 PROCEDURE — 93295 DEV INTERROG REMOTE 1/2/MLT: CPT | Mod: ,,, | Performed by: INTERNAL MEDICINE

## 2024-07-16 LAB
OHS CV AF BURDEN PERCENT: < 1
OHS CV DC REMOTE DEVICE TYPE: NORMAL

## 2024-08-19 ENCOUNTER — TELEPHONE (OUTPATIENT)
Dept: ELECTROPHYSIOLOGY | Facility: CLINIC | Age: 39
End: 2024-08-19
Payer: MEDICARE

## 2024-08-19 ENCOUNTER — CLINICAL SUPPORT (OUTPATIENT)
Dept: CARDIOLOGY | Facility: HOSPITAL | Age: 39
End: 2024-08-19
Attending: INTERNAL MEDICINE
Payer: MEDICARE

## 2024-08-19 ENCOUNTER — CLINICAL SUPPORT (OUTPATIENT)
Dept: CARDIOLOGY | Facility: HOSPITAL | Age: 39
End: 2024-08-19
Payer: MEDICARE

## 2024-08-19 DIAGNOSIS — I50.9 HEART FAILURE, UNSPECIFIED: ICD-10-CM

## 2024-08-19 NOTE — TELEPHONE ENCOUNTER
Called and spoke to pt on this am. Pt informed of ICD @ LAURIE, that a message had been sent to Dr. Serrano and his nurse and that she should receive a call from his nurse late this week or early next week to schedule gen change.  Understanding was verbalized.

## 2024-08-19 NOTE — TELEPHONE ENCOUNTER
The patients' CIED has reached ELECTIVE REPLACEMENT for S-ICD w/ BATTERY ADVISORY     Current Device  and Model:        Date of LAURIE, if known: n/a- Ryan [employer] rep contacted technical support and is estimated to have 3 months remaining on ICD Battery    Is this replacement indicator early or unexpected due to an advisory:  Yes     Battery Voltage (if available): n/a    Anticoagulation Status: Not on OAC    Last EF: 35-40% on 9/11/23    Any known lead recalls:  No    Leads MRI compatible:  Yes     Any history of treated ventricular arrhythmias?   No        *RN coordinator notified for scheduling; device coordinator notified to contact patient

## 2024-08-20 ENCOUNTER — PATIENT MESSAGE (OUTPATIENT)
Dept: ELECTROPHYSIOLOGY | Facility: CLINIC | Age: 39
End: 2024-08-20
Payer: MEDICARE

## 2024-08-20 ENCOUNTER — TELEPHONE (OUTPATIENT)
Dept: ELECTROPHYSIOLOGY | Facility: CLINIC | Age: 39
End: 2024-08-20
Payer: MEDICARE

## 2024-08-20 DIAGNOSIS — I50.30 CHF WITH LEFT VENTRICULAR DIASTOLIC DYSFUNCTION, NYHA CLASS 2: Primary | ICD-10-CM

## 2024-08-20 LAB
OHS CV AF BURDEN PERCENT: < 1
OHS CV DC REMOTE DEVICE TYPE: NORMAL
OHS CV ICD SHOCK: NO

## 2024-08-20 NOTE — TELEPHONE ENCOUNTER
Called pt, scheduled for gen change on 9/9, will send pre procedure instructions via portal per pts request

## 2024-09-06 ENCOUNTER — TELEPHONE (OUTPATIENT)
Dept: ELECTROPHYSIOLOGY | Facility: CLINIC | Age: 39
End: 2024-09-06
Payer: MEDICARE

## 2024-09-06 NOTE — TELEPHONE ENCOUNTER
Spoke to patient    CONFIRMED procedure arrival time of 9 am    Reiterated instructions including:    -Directions to check in desk    -NPO after midnight night prior to procedure. Fasting upon arrival to the hospital the day of the procedure.     -High importance of HOLDING Jardiance on day of procedure    - Confirms no new meds prescribed or med changes since scheduling -     -Pre-procedure LABS Reviewed, no alerts noted    -Confirmed absence or presence of implanted device/stimulator S-ICD BSci in situ    -Confirmed no recent or current fever, cough, or shortness of breath .    -Confirmed no redness, rash, irritation, or yeast infection to skin/ chest area.     -Bathe night prior and morning prior to procedure with Hibiclens solution or an antibacterial soap      Patient verbalized understanding of above, denies any further questions and appreciated the call.

## 2024-09-08 NOTE — H&P
[facility]  Electrophysiology  H&P    Patient Name: Cj Silva  MRN: [id number]    Subjective:   Cj Silva is a 38 y.o. female with a PMHx significant for postpartum NICM, s/p subq-ICD 2017 ([place]), CHF, HTN. No events. Patient here today for generator change. Implanted 17 for primary prevention and HFrEF.    Bed side echo with LVEF 25-30%, unchanged from prior.    Anticoagulation status: none    Echo:   EF   Date Value Ref Range Status   2022 30 % Final     History:     Past Medical History:   Diagnosis Date    Cardiomyopathy     CHF (congestive heart failure)     Hypertension       Past Surgical History:   Procedure Laterality Date    INSERTION OF PACEMAKER        Meds:   Review of patient's allergies indicates:  No Known Allergies  Current Outpatient Medications   Medication Instructions    albuterol-ipratropium (DUO-NEB) 2.5 mg-0.5 mg/3 mL nebulizer solution 3 mLs, Inhalation, Every 6 hours PRN    amLODIPine (NORVASC) 5 mg, Oral, Daily    carvediloL (COREG) 25 mg, Oral, 2 times daily with meals    doxycycline (VIBRA-TABS) 100 mg, Oral, 2 times daily    empagliflozin (JARDIANCE) 10 mg, Oral, Daily    famotidine (PEPCID) 20 MG tablet SMARTSI Tablet(s) By Mouth Every Evening    furosemide (LASIX) 20 mg, Oral, Every other day    meclizine (ANTIVERT) 25 mg, Oral, 3 times daily PRN    prochlorperazine (COMPAZINE) 10 mg, Oral, Every 6 hours PRN    sacubitril-valsartan (ENTRESTO)  mg per tablet 1 tablet, Oral, 2 times daily    spironolactone (ALDACTONE) 50 mg, Oral, Daily    traMADoL (ULTRAM) 50 mg, Oral, Every 6 hours PRN     Review of Systems   Constitutional:  Negative for chills, diaphoresis, fever and malaise/fatigue.   HENT:  Negative for sore throat.    Eyes:  Negative for double vision.   Respiratory:  Negative for cough, shortness of breath and wheezing.    Cardiovascular:  Negative for chest pain, palpitations, orthopnea,  "claudication, leg swelling and PND.   Gastrointestinal:  Negative for abdominal pain, blood in stool, constipation, heartburn, nausea and vomiting.   Genitourinary:  Negative for hematuria.   Musculoskeletal:  Negative for falls and myalgias.   Neurological:  Negative for dizziness, loss of consciousness, weakness and headaches.   Psychiatric/Behavioral:  The patient is not nervous/anxious.      Objective:   /76 (BP Location: Left arm, Patient Position: Lying)   Pulse 75   Temp 98.2 °F (36.8 °C) (Temporal)   Resp 19   Ht 5' 1" (1.549 m)   Wt 87.5 kg (193 lb)   LMP 03/06/2022   SpO2 97%   Breastfeeding No   BMI 36.47 kg/m²   Physical Exam  Constitutional:       General: She is not in acute distress.  HENT:      Head: Normocephalic and atraumatic.   Eyes:      Pupils: Pupils are equal, round, and reactive to light.   Neck:      Vascular: No carotid bruit or JVD.   Cardiovascular:      Rate and Rhythm: Regular rhythm. Bradycardia present.      Heart sounds: Normal heart sounds. No murmur heard.     No friction rub. No gallop.   Pulmonary:      Effort: Pulmonary effort is normal. No respiratory distress.      Breath sounds: Normal breath sounds. No wheezing or rales.   Chest:      Chest wall: No tenderness.   Abdominal:      General: Bowel sounds are normal. There is no distension.      Palpations: Abdomen is soft.      Tenderness: There is no abdominal tenderness.   Musculoskeletal:      Right lower leg: No edema.      Left lower leg: No edema.   Skin:     General: Skin is warm and dry.      Findings: No erythema.   Neurological:      Mental Status: She is alert and oriented to person, place, and time.   Psychiatric:         Mood and Affect: Mood and affect normal.         Cognition and Memory: Memory normal.         Judgment: Judgment normal.       Labs:     Lab Results   Component Value Date     02/23/2023    K 3.7 02/23/2023     02/23/2023    CO2 23 02/23/2023    BUN 9 02/23/2023    " CREATININE 0.7 02/23/2023    ANIONGAP 9 02/23/2023     Lab Results   Component Value Date    HGBA1C 5.0 09/02/2022     Lab Results   Component Value Date    BNP 17 02/12/2024    BNP 23 06/29/2023    BNP 31 03/18/2023    BNP 77 04/02/2018    BNP 57 09/15/2017    BNP 48 06/19/2017    Lab Results   Component Value Date    WBC 7.8 12/11/2023    WBC 7.51 02/23/2023    HGB 12.0 12/11/2023    HGB 12.2 02/23/2023    HCT 39.3 12/11/2023    HCT 38.7 02/23/2023     12/11/2023     02/23/2023    GRAN 2.6 02/23/2023    GRAN 34.3 (L) 02/23/2023     Lab Results   Component Value Date    CHOL 190 12/11/2023    HDL 49 12/11/2023    LDLCALC 128 (H) 12/11/2023    TRIG 70 12/11/2023          Assessment & Plan:     Mrs. Silva is a 38 y.o. female with a PMHx significant for postpartum NICM, s/p s-ICD 11/2017 ([place]), HFrEF, HTN. No events, due for generator change.    Anticoagulation status: none    Patient is here for generator change of left sided subq-ICD  - Device clinic follow up in 1 week  - Antibiotics x 5 days  - If patient is on oral anticoagulation, they will hold this for 5 days post-procedure    The risks, benefits and alternatives of the procedure were explained to the patient, patient's family and/or surrogate decision maker. Risks include (but not limited to) bleeding, hematoma, infection, pain, damage to structures surrounding generator pocket site. All questions were answered. Patient is understanding of these risks, and would like to proceed. Consents signed.    Case discussed with Dr. Serrano.    Signed:  Salas Shrestha MD  Cardiovascular Disease PGY-VI  [facility]

## 2024-09-09 ENCOUNTER — HOSPITAL ENCOUNTER (OUTPATIENT)
Facility: HOSPITAL | Age: 39
Discharge: HOME | End: 2024-09-09
Attending: INTERNAL MEDICINE | Admitting: INTERNAL MEDICINE
Payer: MEDICARE

## 2024-09-09 ENCOUNTER — ANESTHESIA (OUTPATIENT)
Dept: MEDSURG UNIT | Facility: HOSPITAL | Age: 39
End: 2024-09-09
Payer: MEDICARE

## 2024-09-09 ENCOUNTER — ANESTHESIA EVENT (OUTPATIENT)
Dept: MEDSURG UNIT | Facility: HOSPITAL | Age: 39
End: 2024-09-09
Payer: MEDICARE

## 2024-09-09 VITALS
WEIGHT: 193 LBS | HEIGHT: 61 IN | HEART RATE: 75 BPM | DIASTOLIC BLOOD PRESSURE: 86 MMHG | BODY MASS INDEX: 36.44 KG/M2 | TEMPERATURE: 98 F | OXYGEN SATURATION: 96 % | SYSTOLIC BLOOD PRESSURE: 138 MMHG | RESPIRATION RATE: 20 BRPM

## 2024-09-09 DIAGNOSIS — I42.8 NICM (NONISCHEMIC CARDIOMYOPATHY): Primary | ICD-10-CM

## 2024-09-09 DIAGNOSIS — Z95.9 CARDIAC DEVICE IN SITU: ICD-10-CM

## 2024-09-09 DIAGNOSIS — Z45.02 ICD (IMPLANTABLE CARDIOVERTER-DEFIBRILLATOR) BATTERY DEPLETION: ICD-10-CM

## 2024-09-09 LAB
OHS QRS DURATION: 86 MS
OHS QRS DURATION: 90 MS
OHS QTC CALCULATION: 433 MS
OHS QTC CALCULATION: 445 MS

## 2024-09-09 PROCEDURE — 99234 HOSP IP/OBS SM DT SF/LOW 45: CPT | Mod: GC,,, | Performed by: INTERNAL MEDICINE

## 2024-09-09 PROCEDURE — 93641 EP EVL 1/2CHMB PAC CVDFB TST: CPT | Mod: 26,,, | Performed by: INTERNAL MEDICINE

## 2024-09-09 PROCEDURE — 93005 ELECTROCARDIOGRAM TRACING: CPT

## 2024-09-09 PROCEDURE — 33262 RMVL& REPLC PULSE GEN 1 LEAD: CPT | Performed by: INTERNAL MEDICINE

## 2024-09-09 PROCEDURE — C1722 AICD, SINGLE CHAMBER: HCPCS | Performed by: INTERNAL MEDICINE

## 2024-09-09 PROCEDURE — 93641 EP EVL 1/2CHMB PAC CVDFB TST: CPT | Performed by: INTERNAL MEDICINE

## 2024-09-09 PROCEDURE — 93010 ELECTROCARDIOGRAM REPORT: CPT | Mod: ,,, | Performed by: INTERNAL MEDICINE

## 2024-09-09 PROCEDURE — 25000003 PHARM REV CODE 250: Performed by: STUDENT IN AN ORGANIZED HEALTH CARE EDUCATION/TRAINING PROGRAM

## 2024-09-09 PROCEDURE — 37000008 HC ANESTHESIA 1ST 15 MINUTES: Performed by: INTERNAL MEDICINE

## 2024-09-09 PROCEDURE — 25000003 PHARM REV CODE 250

## 2024-09-09 PROCEDURE — 63600175 PHARM REV CODE 636 W HCPCS: Performed by: INTERNAL MEDICINE

## 2024-09-09 PROCEDURE — 63600175 PHARM REV CODE 636 W HCPCS

## 2024-09-09 PROCEDURE — 37000009 HC ANESTHESIA EA ADD 15 MINS: Performed by: INTERNAL MEDICINE

## 2024-09-09 PROCEDURE — 33262 RMVL& REPLC PULSE GEN 1 LEAD: CPT | Mod: ,,, | Performed by: INTERNAL MEDICINE

## 2024-09-09 PROCEDURE — 27201423 OPTIME MED/SURG SUP & DEVICES STERILE SUPPLY: Performed by: INTERNAL MEDICINE

## 2024-09-09 PROCEDURE — 25000003 PHARM REV CODE 250: Performed by: INTERNAL MEDICINE

## 2024-09-09 DEVICE — SUBCUTANEOUS IMPLANTABLE CARDIOVERTER DEFIBRILLATOR
Type: IMPLANTABLE DEVICE | Site: OTHER (ADD COMMENT) | Status: FUNCTIONAL
Brand: EMBLEM™ MRI S-ICD

## 2024-09-09 RX ORDER — ONDANSETRON HYDROCHLORIDE 2 MG/ML
4 INJECTION, SOLUTION INTRAVENOUS DAILY PRN
Status: DISCONTINUED | OUTPATIENT
Start: 2024-09-09 | End: 2024-09-09 | Stop reason: HOSPADM

## 2024-09-09 RX ORDER — SODIUM CHLORIDE 0.9 G/100ML
IRRIGANT IRRIGATION
Status: DISCONTINUED | OUTPATIENT
Start: 2024-09-09 | End: 2024-09-09 | Stop reason: HOSPADM

## 2024-09-09 RX ORDER — ACETAMINOPHEN 325 MG/1
650 TABLET ORAL EVERY 4 HOURS PRN
Status: DISCONTINUED | OUTPATIENT
Start: 2024-09-09 | End: 2024-09-09 | Stop reason: HOSPADM

## 2024-09-09 RX ORDER — BUPIVACAINE HYDROCHLORIDE 2.5 MG/ML
INJECTION, SOLUTION EPIDURAL; INFILTRATION; INTRACAUDAL
Status: DISCONTINUED | OUTPATIENT
Start: 2024-09-09 | End: 2024-09-09 | Stop reason: HOSPADM

## 2024-09-09 RX ORDER — FENTANYL CITRATE 50 UG/ML
25 INJECTION, SOLUTION INTRAMUSCULAR; INTRAVENOUS EVERY 5 MIN PRN
Status: DISCONTINUED | OUTPATIENT
Start: 2024-09-09 | End: 2024-09-09 | Stop reason: HOSPADM

## 2024-09-09 RX ORDER — DOXYCYCLINE HYCLATE 100 MG
100 TABLET ORAL 2 TIMES DAILY
Qty: 10 TABLET | Refills: 0 | Status: SHIPPED | OUTPATIENT
Start: 2024-09-09 | End: 2024-09-14

## 2024-09-09 RX ORDER — FENTANYL CITRATE 50 UG/ML
INJECTION, SOLUTION INTRAMUSCULAR; INTRAVENOUS
Status: DISCONTINUED | OUTPATIENT
Start: 2024-09-09 | End: 2024-09-09

## 2024-09-09 RX ORDER — LIDOCAINE HYDROCHLORIDE 20 MG/ML
INJECTION, SOLUTION INFILTRATION; PERINEURAL
Status: DISCONTINUED | OUTPATIENT
Start: 2024-09-09 | End: 2024-09-09 | Stop reason: HOSPADM

## 2024-09-09 RX ORDER — MIDAZOLAM HYDROCHLORIDE 1 MG/ML
INJECTION INTRAMUSCULAR; INTRAVENOUS
Status: DISCONTINUED | OUTPATIENT
Start: 2024-09-09 | End: 2024-09-09

## 2024-09-09 RX ORDER — GLUCAGON 1 MG
1 KIT INJECTION
Status: DISCONTINUED | OUTPATIENT
Start: 2024-09-09 | End: 2024-09-09 | Stop reason: HOSPADM

## 2024-09-09 RX ORDER — PROPOFOL 10 MG/ML
VIAL (ML) INTRAVENOUS CONTINUOUS PRN
Status: DISCONTINUED | OUTPATIENT
Start: 2024-09-09 | End: 2024-09-09

## 2024-09-09 RX ORDER — VANCOMYCIN HYDROCHLORIDE 1 G/20ML
INJECTION, POWDER, LYOPHILIZED, FOR SOLUTION INTRAVENOUS
Status: DISCONTINUED | OUTPATIENT
Start: 2024-09-09 | End: 2024-09-09 | Stop reason: HOSPADM

## 2024-09-09 RX ORDER — ONDANSETRON HYDROCHLORIDE 2 MG/ML
INJECTION, SOLUTION INTRAVENOUS
Status: DISCONTINUED | OUTPATIENT
Start: 2024-09-09 | End: 2024-09-09

## 2024-09-09 RX ORDER — DEXMEDETOMIDINE HYDROCHLORIDE 100 UG/ML
INJECTION, SOLUTION INTRAVENOUS
Status: DISCONTINUED | OUTPATIENT
Start: 2024-09-09 | End: 2024-09-09

## 2024-09-09 RX ORDER — LIDOCAINE HYDROCHLORIDE 20 MG/ML
INJECTION INTRAVENOUS
Status: DISCONTINUED | OUTPATIENT
Start: 2024-09-09 | End: 2024-09-09

## 2024-09-09 RX ORDER — CEFAZOLIN SODIUM 1 G/3ML
INJECTION, POWDER, FOR SOLUTION INTRAMUSCULAR; INTRAVENOUS
Status: DISCONTINUED | OUTPATIENT
Start: 2024-09-09 | End: 2024-09-09

## 2024-09-09 RX ORDER — DEXAMETHASONE SODIUM PHOSPHATE 4 MG/ML
INJECTION, SOLUTION INTRA-ARTICULAR; INTRALESIONAL; INTRAMUSCULAR; INTRAVENOUS; SOFT TISSUE
Status: DISCONTINUED | OUTPATIENT
Start: 2024-09-09 | End: 2024-09-09

## 2024-09-09 RX ORDER — PHENYLEPHRINE HYDROCHLORIDE 10 MG/ML
INJECTION INTRAVENOUS
Status: DISCONTINUED | OUTPATIENT
Start: 2024-09-09 | End: 2024-09-09

## 2024-09-09 RX ADMIN — PHENYLEPHRINE HYDROCHLORIDE 100 MCG: 10 INJECTION INTRAVENOUS at 10:09

## 2024-09-09 RX ADMIN — LIDOCAINE HYDROCHLORIDE 50 MG: 20 INJECTION INTRAVENOUS at 10:09

## 2024-09-09 RX ADMIN — DEXMEDETOMIDINE 4 MCG: 200 INJECTION, SOLUTION INTRAVENOUS at 11:09

## 2024-09-09 RX ADMIN — DEXMEDETOMIDINE 8 MCG: 200 INJECTION, SOLUTION INTRAVENOUS at 10:09

## 2024-09-09 RX ADMIN — PHENYLEPHRINE HYDROCHLORIDE 100 MCG: 10 INJECTION INTRAVENOUS at 11:09

## 2024-09-09 RX ADMIN — PHENYLEPHRINE HYDROCHLORIDE 200 MCG: 10 INJECTION INTRAVENOUS at 10:09

## 2024-09-09 RX ADMIN — DEXAMETHASONE SODIUM PHOSPHATE 4 MG: 4 INJECTION, SOLUTION INTRAMUSCULAR; INTRAVENOUS at 10:09

## 2024-09-09 RX ADMIN — PROPOFOL 100 MCG/KG/MIN: 10 INJECTION, EMULSION INTRAVENOUS at 10:09

## 2024-09-09 RX ADMIN — PHENYLEPHRINE HYDROCHLORIDE 200 MCG: 10 INJECTION INTRAVENOUS at 11:09

## 2024-09-09 RX ADMIN — FENTANYL CITRATE 25 MCG: 0.05 INJECTION, SOLUTION INTRAMUSCULAR; INTRAVENOUS at 10:09

## 2024-09-09 RX ADMIN — PROPOFOL 20 MG: 10 INJECTION, EMULSION INTRAVENOUS at 11:09

## 2024-09-09 RX ADMIN — ACETAMINOPHEN 650 MG: 325 TABLET ORAL at 02:09

## 2024-09-09 RX ADMIN — PROPOFOL 20 MG: 10 INJECTION, EMULSION INTRAVENOUS at 10:09

## 2024-09-09 RX ADMIN — ONDANSETRON 4 MG: 2 INJECTION INTRAMUSCULAR; INTRAVENOUS at 10:09

## 2024-09-09 RX ADMIN — MIDAZOLAM HYDROCHLORIDE 2 MG: 2 INJECTION, SOLUTION INTRAMUSCULAR; INTRAVENOUS at 10:09

## 2024-09-09 RX ADMIN — GLYCOPYRROLATE 0.1 MG: 0.2 INJECTION, SOLUTION INTRAMUSCULAR; INTRAVENOUS at 10:09

## 2024-09-09 RX ADMIN — SODIUM CHLORIDE: 9 INJECTION, SOLUTION INTRAVENOUS at 09:09

## 2024-09-09 RX ADMIN — CEFAZOLIN 2 G: 330 INJECTION, POWDER, FOR SOLUTION INTRAMUSCULAR; INTRAVENOUS at 10:09

## 2024-09-09 NOTE — NURSING TRANSFER
Nursing Transfer Note      9/9/2024   1:49 PM    Nurse giving handoff:Urszula REESE  Nurse receiving handoff:Mirna REESE    [facility]    Transfer via stretcher    Transfer with N/A    Transported by RN    Order for Tele Monitor? No    Medicines sent: prescriptions sent with pt     Any special needs or follow-up needed: no    Patient belongings transferred with patient: No    Chart send with patient: Yes    Notified: son    Patient reassessed at: 1330

## 2024-09-09 NOTE — Clinical Note
The left chest and left abdomen was prepped. The site was prepped with ChloraPrep and Ioban. The site was clipped. The patient was draped.

## 2024-09-09 NOTE — Clinical Note
New generator, [facility] S-ICD S/N [id number] inserted into pocket at left mid axillary/sub mammary area

## 2024-09-09 NOTE — ANESTHESIA POSTPROCEDURE EVALUATION
Anesthesia Post Evaluation    Patient: Cj Silva    Procedure(s) Performed: Procedure(s) (LRB):  REPLACEMENT, ICD GENERATOR (N/A)    Final Anesthesia Type: general      Patient location during evaluation: PACU  Patient participation: Yes- Able to Participate  Level of consciousness: awake and alert  Post-procedure vital signs: reviewed and stable  Pain management: adequate  Airway patency: patent    PONV status at discharge: No PONV  Anesthetic complications: no      Cardiovascular status: blood pressure returned to baseline  Respiratory status: unassisted  Hydration status: euvolemic  Follow-up not needed.              Vitals Value Taken Time   /86 09/09/24 1400   Temp 36.5 °C (97.7 °F) 09/09/24 1400   Pulse 72 09/09/24 1341   Resp 21 09/09/24 1341   SpO2 96 % 09/09/24 1400   Vitals shown include unfiled device data.      No case tracking events are documented in the log.      Pain/Chel Score: Pain Rating Prior to Med Admin: 8 (9/9/2024  2:21 PM)  Pain Rating Post Med Admin: 3 (9/9/2024  2:50 PM)  Chel Score: 10 (9/9/2024 11:50 AM)

## 2024-09-09 NOTE — PLAN OF CARE
Pt discharged in care of son and uncle via wheelchair to transport.Hep lock dc'd.Discharge instructions given and verbalized understanding.Dressing CD&I.

## 2024-09-09 NOTE — ANESTHESIA PREPROCEDURE EVALUATION
2024  Cj Silva is a 38 y.o., female with cardiomyopathy here for SICD generator change.    Pre-operative evaluation for Procedure(s) (LRB):  REPLACEMENT, ICD GENERATOR (N/A)        Patient Active Problem List   Diagnosis    Peripartum cardiomyopathy    CHF with left ventricular diastolic dysfunction, NYHA class 2    Non-ischemic cardiomyopathy    Severe obesity (BMI 35.0-39.9) with comorbidity    Hypertensive pulmonary vascular disease    ICD (implantable cardioverter-defibrillator) battery depletion       Review of patient's allergies indicates:  No Known Allergies    No current facility-administered medications on file prior to encounter.     Current Outpatient Medications on File Prior to Encounter   Medication Sig Dispense Refill    amLODIPine (NORVASC) 5 MG tablet Take 1 tablet (5 mg total) by mouth once daily. 30 tablet 11    carvedilol (COREG) 25 MG tablet Take 25 mg by mouth 2 (two) times daily with meals.      famotidine (PEPCID) 20 MG tablet SMARTSI Tablet(s) By Mouth Every Evening      furosemide (LASIX) 20 MG tablet Take 20 mg by mouth every other day.       sacubitril-valsartan (ENTRESTO)  mg per tablet Take 1 tablet by mouth 2 (two) times daily. 60 tablet 11    spironolactone (ALDACTONE) 50 MG tablet Take 1 tablet (50 mg total) by mouth once daily. 90 tablet 3    albuterol-ipratropium (DUO-NEB) 2.5 mg-0.5 mg/3 mL nebulizer solution Inhale 3 mLs into the lungs every 6 (six) hours as needed.      empagliflozin (JARDIANCE) 10 mg tablet Take 10 mg by mouth once daily.      meclizine (ANTIVERT) 25 mg tablet Take 1 tablet (25 mg total) by mouth 3 (three) times daily as needed. 30 tablet 0    prochlorperazine (COMPAZINE) 10 MG tablet Take 1 tablet (10 mg total) by mouth every 6 (six) hours as needed. 30 tablet 0    traMADoL (ULTRAM) 50 mg tablet Take 1 tablet (50 mg  "total) by mouth every 6 (six) hours as needed for Pain. 20 tablet 0       Past Surgical History:   Procedure Laterality Date    INSERTION OF PACEMAKER         Social History     Socioeconomic History    Marital status: Single   Tobacco Use    Smoking status: Never    Smokeless tobacco: Never   Substance and Sexual Activity    Alcohol use: Yes    Drug use: No    Sexual activity: Yes     Partners: Male         CBC: No results for input(s): "WBC", "RBC", "HGB", "HCT", "PLT", "MCV", "MCH", "MCHC" in the last 72 hours.    CMP: No results for input(s): "NA", "K", "CL", "CO2", "BUN", "CREATININE", "GLU", "MG", "PHOS", "CALCIUM", "ALBUMIN", "PROT", "ALKPHOS", "ALT", "AST", "BILITOT" in the last 72 hours.    INR  No results for input(s): "PT", "INR", "PROTIME", "APTT" in the last 72 hours.            2D Echo:  Results for orders placed or performed during the hospital encounter of 09/15/17   2D Echo w/ Color Flow Doppler   Result Value Ref Range    EF + QEF 25 (A) 55 - 65    Mitral Valve Regurgitation TRIVIAL     Diastolic Dysfunction Yes (A)     Est. PA Systolic Pressure 21.84     Tricuspid Valve Regurgitation TRIVIAL            Pre-op Assessment    I have reviewed the Patient Summary Reports.     I have reviewed the Nursing Notes.    I have reviewed the Medications.     Review of Systems  Anesthesia Hx:  No problems with previous Anesthesia                Hematology/Oncology:  Hematology Normal   Oncology Normal                                   EENT/Dental:  EENT/Dental Normal           Cardiovascular:    Pacemaker Hypertension       CHF                                 Pulmonary:  Pulmonary Normal                       Renal/:  Renal/ Normal                 Hepatic/GI:  Hepatic/GI Normal                 Musculoskeletal:  Musculoskeletal Normal                Neurological:  Neurology Normal                                      Endocrine:  Endocrine Normal            Dermatological:  Skin Normal    Psych:  Psychiatric " Normal                    Physical Exam  General: Well nourished    Airway:  Mallampati: II   Mouth Opening: Normal  TM Distance: Normal  Tongue: Normal  Neck ROM: Normal ROM    Dental:  Intact    Chest/Lungs:  Clear to auscultation, Normal Respiratory Rate    Heart:  Rate: Normal  Rhythm: Regular Rhythm  Sounds: Normal        Anesthesia Plan  Type of Anesthesia, risks & benefits discussed:    Anesthesia Type: Gen Natural Airway  Intra-op Monitoring Plan: Standard ASA Monitors  Post Op Pain Control Plan: multimodal analgesia  Induction:  IV  Informed Consent: Informed consent signed with the Patient and all parties understand the risks and agree with anesthesia plan.  All questions answered.   ASA Score: 4  Anesthesia Plan Notes: Plan for general natural airway, discussed anesthetic risks, questions answered    Ready For Surgery From Anesthesia Perspective.     .

## 2024-09-09 NOTE — PLAN OF CARE
Pt received from PACU via stretcher.Vsstable.No c/o pain or discomfort.Aquacel dressing in place under arm intact.Pt voided upon arrival to floor.Son at bedside and pt oriented to room.

## 2024-09-09 NOTE — Clinical Note
Incision made in the inframammary area. Pocket was opened just above the fascia with sharp and blunt dissection, plasma blade and electrocautery.

## 2024-09-09 NOTE — TRANSFER OF CARE
"Anesthesia Transfer of Care Note    Patient: Cj Silva    Procedure(s) Performed: Procedure(s) (LRB):  REPLACEMENT, ICD GENERATOR (N/A)    Patient location: PACU    Anesthesia Type: general    Transport from OR: Transported from OR on 6-10 L/min O2 by face mask with adequate spontaneous ventilation    Post pain: adequate analgesia    Post assessment: no apparent anesthetic complications and tolerated procedure well    Post vital signs: stable    Level of consciousness: awake and alert    Nausea/Vomiting: no nausea/vomiting    Complications: none    Transfer of care protocol was followed      Last vitals: Visit Vitals  /87   Pulse 75   Temp 36.8 °C (98.2 °F) (Temporal)   Resp 19   Ht 5' 1" (1.549 m)   Wt 87.5 kg (193 lb)   LMP 03/06/2022   SpO2 97%   Breastfeeding No   BMI 36.47 kg/m²     "

## 2024-09-09 NOTE — Clinical Note
There was an existing generator. The generator was removed. The leads were disconnected. The generator was returned to . The generator was returned due to LAURIE/EOL

## 2024-09-09 NOTE — DISCHARGE SUMMARY
Electrophysiology  Discharge Summary      Admit Date: 9/9/2024  Discharge Date:  9/9/2024  Attending Physician: Christian Serrano MD  Discharge Physician: Salas Shrestha MD    Principal Diagnoses: ICD (implantable cardioverter-defibrillator) battery depletion  Indication for Admission: REPLACEMENT, ICD GENERATOR (N/A)    Discharged Condition: Good    Hospital Course:   Patient underwent successful generator change of subQ ICD for SCD primary prevention without evidence of complications intra- and post-procedure.     EP medications at discharge:  Initiation of doxycyline 100 mg BID x 5 days.  Patient is not on anticoagulation     Patient given activity restrictions and warning signs/symptoms to monitor for, including chest pain, shortness of breath, fevers, or evidence of complications at the generator site [swelling, drainage, redness, tenderness, or warmth]. They were instructed to seek immediate medical attention if these occur and to contract the EP department. Follow up with device clinic in 1 week. No CP, SOB, or complications at the generator site on discharge. All questions and concerns answered prior to discharge.     Medication instructions:  Complete your 5 days of antibiotics  If you are on a blood thinner (examples: apixiban [Eliquis], rivaroxaban [Xarelto], dabigatran [Pradaxa], or enoxaparin [Lovenox]), do not take your blood thinner for the next 5 days after your procedure. You can resume after 5 days post-procedure (i.e., when you complete your antibiotics). If you are on Coumadin you can continue to take it the day of your procedure  Pain control: you can take up to Tylenol 1000 mg every 6 hours as needed or (if you do not have kidney disease) ibuprofen 600 mg every 6 hours as needed as needed for pain control (if you do not have kidney disease). If unsure, please contact your primary care physician for recommendations.    Activity restrictions & precautions:    Surgical site   Dressing (see  images below)  **Note: these are general rules to follow unless instructed otherwise** - see images below  If you have a brown rectangular gel bandage (A.K.A. Aquacel Ag dressing) over your surgical incision do not remove it. This will be removed at your device clinic follow up appointment in 1 week.  If you have a square light brown bandage (A.K.A Mepilex dressing) you should remove in 48 hours after your procedure.  If you have a pressure dressing (white elastic tape with gauze underneath or a very large bandage that covers your left chest and is shaped like a triangle) over your surgical site, this should be removed the morning after your procedure unless instructed otherwise.  Do not place any creams or ointments over the surgical site. This could effect the integrity of the Dermabond glue.  You can shower after 24 hours post-procedure, but do not let the jet directly hit your pocket site for at least 2 weeks. Recommend showering with your back to the shower head.   Do not reach your arm (on the same side as your device) around your back during showering for 6 weeks.  Do not submerge your surgical incision site under water (swimming, bathing if you submerge the actual surgical site) for at least 6 week. It is imperative that the surgical site is completely healed prior to doing so    Follow up in device clinic in 1 week to check your incision and device function  Please contact the electrophysiology clinic if you have any questions or if you experience: potential surgical/pocket site complications (pain, swelling, bleeding, drainage), fevers, chest pain/shortness of breath, or for any other concerns.         Diet: Cardiac diet  Disposition: Home or Self Care    Follow Up:   Follow-up Information       DEVICE CHECK CLINIC Follow up in 1 week(s).    Why: For wound re-check             Christina Serrano MD Follow up in 3 month(s).    Specialties: Electrophysiology, Cardiology  Contact information:  [address]  [address]  [facility] [address]  [phone]                           Discharge Medications:      Medication List        START taking these medications      doxycycline 100 MG tablet  Commonly known as: VIBRA-TABS  Take 1 tablet (100 mg total) by mouth 2 (two) times daily. for 5 days            CONTINUE taking these medications      albuterol-ipratropium 2.5 mg-0.5 mg/3 mL nebulizer solution  Commonly known as: DUO-NEB     amLODIPine 5 MG tablet  Commonly known as: NORVASC  Take 1 tablet (5 mg total) by mouth once daily.     carvediloL 25 MG tablet  Commonly known as: COREG     famotidine 20 MG tablet  Commonly known as: PEPCID     furosemide 20 MG tablet  Commonly known as: LASIX     JARDIANCE 10 mg tablet  Generic drug: empagliflozin     meclizine 25 mg tablet  Commonly known as: ANTIVERT  Take 1 tablet (25 mg total) by mouth 3 (three) times daily as needed.     prochlorperazine 10 MG tablet  Commonly known as: COMPAZINE  Take 1 tablet (10 mg total) by mouth every 6 (six) hours as needed.     sacubitriL-valsartan  mg per tablet  Commonly known as: ENTRESTO  Take 1 tablet by mouth 2 (two) times daily.     spironolactone 50 MG tablet  Commonly known as: ALDACTONE  Take 1 tablet (50 mg total) by mouth once daily.     traMADoL 50 mg tablet  Commonly known as: ULTRAM  Take 1 tablet (50 mg total) by mouth every 6 (six) hours as needed for Pain.               Where to Get Your Medications        These [facility]  [address] [facility] [address]      Hours: Always Open Phone: [phone]   doxycycline 100 MG tablet         Salas Shrestha MD  Cardiovascular Disease PGY-VI  [facility]

## 2024-09-16 ENCOUNTER — CLINICAL SUPPORT (OUTPATIENT)
Dept: CARDIOLOGY | Facility: HOSPITAL | Age: 39
End: 2024-09-16
Attending: INTERNAL MEDICINE
Payer: MEDICARE

## 2024-09-16 DIAGNOSIS — I50.30 CHF WITH LEFT VENTRICULAR DIASTOLIC DYSFUNCTION, NYHA CLASS 2: ICD-10-CM

## 2024-09-16 PROCEDURE — 93260 PRGRMG DEV EVAL IMPLTBL SYS: CPT

## 2024-09-16 PROCEDURE — 93260 PRGRMG DEV EVAL IMPLTBL SYS: CPT | Mod: 26,GZ,, | Performed by: INTERNAL MEDICINE

## 2024-09-17 ENCOUNTER — TELEPHONE (OUTPATIENT)
Dept: ELECTROPHYSIOLOGY | Facility: CLINIC | Age: 39
End: 2024-09-17
Payer: MEDICARE

## 2024-09-17 DIAGNOSIS — I50.30 CHF WITH LEFT VENTRICULAR DIASTOLIC DYSFUNCTION, NYHA CLASS 2: Primary | ICD-10-CM

## 2024-09-18 LAB — OHS CV DC REMOTE DEVICE TYPE: NORMAL

## 2024-10-14 ENCOUNTER — CLINICAL SUPPORT (OUTPATIENT)
Dept: CARDIOLOGY | Facility: HOSPITAL | Age: 39
End: 2024-10-14
Attending: INTERNAL MEDICINE
Payer: MEDICARE

## 2024-10-14 DIAGNOSIS — I50.9 HEART FAILURE, UNSPECIFIED: ICD-10-CM

## 2024-10-14 DIAGNOSIS — Z95.810 PRESENCE OF AUTOMATIC (IMPLANTABLE) CARDIAC DEFIBRILLATOR: ICD-10-CM

## 2024-10-14 DIAGNOSIS — I42.9 CARDIOMYOPATHY, UNSPECIFIED: ICD-10-CM

## 2024-10-14 PROCEDURE — 93295 DEV INTERROG REMOTE 1/2/MLT: CPT | Mod: ,,, | Performed by: INTERNAL MEDICINE

## 2024-10-14 PROCEDURE — 93296 REM INTERROG EVL PM/IDS: CPT | Performed by: INTERNAL MEDICINE

## 2024-10-20 LAB
OHS CV AF BURDEN PERCENT: < 1
OHS CV DC REMOTE DEVICE TYPE: NORMAL

## 2024-11-21 ENCOUNTER — TELEPHONE (OUTPATIENT)
Dept: ELECTROPHYSIOLOGY | Facility: CLINIC | Age: 39
End: 2024-11-21
Payer: MEDICARE

## 2024-11-21 ENCOUNTER — PATIENT MESSAGE (OUTPATIENT)
Dept: ELECTROPHYSIOLOGY | Facility: CLINIC | Age: 39
End: 2024-11-21
Payer: MEDICARE

## 2024-11-22 ENCOUNTER — PATIENT MESSAGE (OUTPATIENT)
Dept: ELECTROPHYSIOLOGY | Facility: CLINIC | Age: 39
End: 2024-11-22
Payer: MEDICARE

## 2024-11-22 ENCOUNTER — TELEPHONE (OUTPATIENT)
Dept: ELECTROPHYSIOLOGY | Facility: CLINIC | Age: 39
End: 2024-11-22
Payer: MEDICARE

## 2024-11-22 NOTE — TELEPHONE ENCOUNTER
Returning patients missed call. Successfully rescheduled. Patient is a former patient of Dr. Serrano. With his departure, patient will be scheduled with Dr. Stock to establish care. Patient agrees to transition to Dr. Stock and agrees with appointment date and time.

## 2025-01-21 ENCOUNTER — CLINICAL SUPPORT (OUTPATIENT)
Dept: CARDIOLOGY | Facility: HOSPITAL | Age: 40
End: 2025-01-21
Payer: MEDICARE

## 2025-01-21 ENCOUNTER — CLINICAL SUPPORT (OUTPATIENT)
Dept: CARDIOLOGY | Facility: HOSPITAL | Age: 40
End: 2025-01-21
Attending: STUDENT IN AN ORGANIZED HEALTH CARE EDUCATION/TRAINING PROGRAM
Payer: MEDICARE

## 2025-01-21 DIAGNOSIS — Z95.810 PRESENCE OF AUTOMATIC (IMPLANTABLE) CARDIAC DEFIBRILLATOR: ICD-10-CM

## 2025-01-21 DIAGNOSIS — I50.9 HEART FAILURE, UNSPECIFIED: ICD-10-CM

## 2025-01-21 DIAGNOSIS — I42.9 CARDIOMYOPATHY, UNSPECIFIED: ICD-10-CM

## 2025-01-21 PROCEDURE — 93296 REM INTERROG EVL PM/IDS: CPT | Performed by: STUDENT IN AN ORGANIZED HEALTH CARE EDUCATION/TRAINING PROGRAM

## 2025-01-21 PROCEDURE — 93295 DEV INTERROG REMOTE 1/2/MLT: CPT | Mod: ,,, | Performed by: STUDENT IN AN ORGANIZED HEALTH CARE EDUCATION/TRAINING PROGRAM

## 2025-01-24 LAB
OHS CV AF BURDEN PERCENT: < 1
OHS CV DC REMOTE DEVICE TYPE: NORMAL

## 2025-03-13 ENCOUNTER — CLINICAL SUPPORT (OUTPATIENT)
Dept: CARDIOLOGY | Facility: HOSPITAL | Age: 40
End: 2025-03-13
Attending: INTERNAL MEDICINE
Payer: MEDICARE

## 2025-03-13 ENCOUNTER — OFFICE VISIT (OUTPATIENT)
Dept: ELECTROPHYSIOLOGY | Facility: CLINIC | Age: 40
End: 2025-03-13
Payer: MEDICARE

## 2025-03-13 ENCOUNTER — HOSPITAL ENCOUNTER (OUTPATIENT)
Dept: CARDIOLOGY | Facility: HOSPITAL | Age: 40
Discharge: HOME | End: 2025-03-13
Attending: STUDENT IN AN ORGANIZED HEALTH CARE EDUCATION/TRAINING PROGRAM
Payer: MEDICARE

## 2025-03-13 ENCOUNTER — HOSPITAL ENCOUNTER (OUTPATIENT)
Dept: CARDIOLOGY | Facility: CLINIC | Age: 40
Discharge: HOME | End: 2025-03-13
Payer: MEDICARE

## 2025-03-13 VITALS
BODY MASS INDEX: 36.63 KG/M2 | HEIGHT: 61 IN | DIASTOLIC BLOOD PRESSURE: 98 MMHG | SYSTOLIC BLOOD PRESSURE: 148 MMHG | HEART RATE: 61 BPM | WEIGHT: 194 LBS

## 2025-03-13 VITALS
BODY MASS INDEX: 36.63 KG/M2 | HEIGHT: 61 IN | WEIGHT: 194 LBS | DIASTOLIC BLOOD PRESSURE: 90 MMHG | HEART RATE: 63 BPM | SYSTOLIC BLOOD PRESSURE: 130 MMHG

## 2025-03-13 DIAGNOSIS — I50.42 CHRONIC COMBINED SYSTOLIC AND DIASTOLIC HEART FAILURE: ICD-10-CM

## 2025-03-13 DIAGNOSIS — I50.30 CHF WITH LEFT VENTRICULAR DIASTOLIC DYSFUNCTION, NYHA CLASS 2: ICD-10-CM

## 2025-03-13 DIAGNOSIS — I42.8 NONISCHEMIC CARDIOMYOPATHY: ICD-10-CM

## 2025-03-13 DIAGNOSIS — I10 PRIMARY HYPERTENSION: ICD-10-CM

## 2025-03-13 DIAGNOSIS — E66.01 SEVERE OBESITY (BMI 35.0-39.9) WITH COMORBIDITY: ICD-10-CM

## 2025-03-13 DIAGNOSIS — Z95.810 ICD (IMPLANTABLE CARDIOVERTER-DEFIBRILLATOR) IN PLACE: ICD-10-CM

## 2025-03-13 LAB
ASCENDING AORTA: 2.63 CM
AV AREA BY CONTINUOUS VTI: 3.6 CM2
AV INDEX (PROSTH): 0.93
AV LVOT MEAN GRADIENT: 2 MMHG
AV LVOT PEAK GRADIENT: 3 MMHG
AV MEAN GRADIENT: 3 MMHG
AV PEAK GRADIENT: 4 MMHG
AV VALVE AREA BY VELOCITY RATIO: 3 CM²
AV VALVE AREA: 3.5 CM2
AV VELOCITY RATIO: 0.8
BSA FOR ECHO PROCEDURE: 1.95 M2
CV ECHO LV RWT: 0.33 CM
DOP CALC AO PEAK VEL: 1 M/S
DOP CALC AO VTI: 20.9 CM
DOP CALC LVOT AREA: 3.8 CM2
DOP CALC LVOT DIAMETER: 2.2 CM
DOP CALC LVOT PEAK VEL: 0.8 M/S
DOP CALC LVOT STROKE VOLUME: 74.1 CM3
DOP CALCLVOT PEAK VEL VTI: 19.5 CM
E WAVE DECELERATION TIME: 228 MS
E/A RATIO: 1.02
E/E' RATIO: 5 M/S
ECHO EF ESTIMATED: 45 %
ECHO LV POSTERIOR WALL: 0.9 CM (ref 0.6–1.1)
EJECTION FRACTION: 43 %
FRACTIONAL SHORTENING: 21.8 % (ref 28–44)
INTERVENTRICULAR SEPTUM: 0.7 CM (ref 0.6–1.1)
IVC DIAMETER: 1.29 CM
LA MAJOR: 4.9 CM
LA MINOR: 5.3 CM
LA WIDTH: 3.9 CM
LEFT ATRIUM SIZE: 3.7 CM
LEFT ATRIUM VOLUME INDEX MOD: 28 ML/M2
LEFT ATRIUM VOLUME INDEX: 34 ML/M2
LEFT ATRIUM VOLUME MOD: 52 ML
LEFT ATRIUM VOLUME: 62 CM3
LEFT INTERNAL DIMENSION IN SYSTOLE: 4.3 CM (ref 2.1–4)
LEFT VENTRICLE DIASTOLIC VOLUME INDEX: 79.57 ML/M2
LEFT VENTRICLE DIASTOLIC VOLUME: 148 ML
LEFT VENTRICLE MASS INDEX: 86 G/M2
LEFT VENTRICLE SYSTOLIC VOLUME INDEX: 43.5 ML/M2
LEFT VENTRICLE SYSTOLIC VOLUME: 81 ML
LEFT VENTRICULAR INTERNAL DIMENSION IN DIASTOLE: 5.5 CM (ref 3.5–6)
LEFT VENTRICULAR MASS: 160 G
LV LATERAL E/E' RATIO: 4.4
LV SEPTAL E/E' RATIO: 6.6
MV PEAK A VEL: 0.52 M/S
MV PEAK E VEL: 0.53 M/S
OHS CV RV/LV RATIO: 0.67 CM
OHS LV EJECTION FRACTION SIMPSONS BIPLANE MOD: 44 %
OHS QRS DURATION: 84 MS
OHS QTC CALCULATION: 458 MS
RA MAJOR: 4.12 CM
RA PRESSURE ESTIMATED: 3 MMHG
RA WIDTH: 3.96 CM
RIGHT ATRIAL AREA: 14 CM2
RIGHT VENTRICLE DIASTOLIC BASEL DIMENSION: 3.7 CM
RV TISSUE DOPPLER FREE WALL SYSTOLIC VELOCITY 1 (APICAL 4 CHAMBER VIEW): 12.3 CM/S
SINUS: 3.17 CM
STJ: 2.87 CM
TDI LATERAL: 0.12 M/S
TDI SEPTAL: 0.08 M/S
TDI: 0.1 M/S
TRICUSPID ANNULAR PLANE SYSTOLIC EXCURSION: 2.24 CM
Z-SCORE OF LEFT VENTRICULAR DIMENSION IN END DIASTOLE: 0.61
Z-SCORE OF LEFT VENTRICULAR DIMENSION IN END SYSTOLE: 2.35

## 2025-03-13 PROCEDURE — 4010F ACE/ARB THERAPY RXD/TAKEN: CPT | Mod: CPTII,S$GLB,, | Performed by: STUDENT IN AN ORGANIZED HEALTH CARE EDUCATION/TRAINING PROGRAM

## 2025-03-13 PROCEDURE — 93306 TTE W/DOPPLER COMPLETE: CPT

## 2025-03-13 PROCEDURE — 3080F DIAST BP >= 90 MM HG: CPT | Mod: CPTII,S$GLB,, | Performed by: STUDENT IN AN ORGANIZED HEALTH CARE EDUCATION/TRAINING PROGRAM

## 2025-03-13 PROCEDURE — 93282 PRGRMG EVAL IMPLANTABLE DFB: CPT

## 2025-03-13 PROCEDURE — 3075F SYST BP GE 130 - 139MM HG: CPT | Mod: CPTII,S$GLB,, | Performed by: STUDENT IN AN ORGANIZED HEALTH CARE EDUCATION/TRAINING PROGRAM

## 2025-03-13 PROCEDURE — 93306 TTE W/DOPPLER COMPLETE: CPT | Mod: 26,,, | Performed by: INTERNAL MEDICINE

## 2025-03-13 PROCEDURE — 99999 PR PBB SHADOW E&M-EST. PATIENT-LVL IV: CPT | Mod: PBBFAC,,, | Performed by: STUDENT IN AN ORGANIZED HEALTH CARE EDUCATION/TRAINING PROGRAM

## 2025-03-13 PROCEDURE — 3008F BODY MASS INDEX DOCD: CPT | Mod: CPTII,S$GLB,, | Performed by: STUDENT IN AN ORGANIZED HEALTH CARE EDUCATION/TRAINING PROGRAM

## 2025-03-13 PROCEDURE — 99215 OFFICE O/P EST HI 40 MIN: CPT | Mod: S$GLB,,, | Performed by: STUDENT IN AN ORGANIZED HEALTH CARE EDUCATION/TRAINING PROGRAM

## 2025-03-13 RX ORDER — TRAZODONE HYDROCHLORIDE 50 MG/1
25-50 TABLET ORAL NIGHTLY PRN
COMMUNITY
Start: 2025-03-10

## 2025-03-13 NOTE — PROGRESS NOTES
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      PRESENTING HISTORY:     History of Present Illness:  Ms. 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 She has a past medical history significant for nonischemic postpartum cardiomyopathy - diagnosed in 2017 following the birth of her fourth child, with persistently reduced systolic function despite compliance with guideline-directed medical therapy, most recent LVEF 40-45%, s/p implantation of a [employer] subcutaneous ICD initially on 2017 with a recent generator change on 2024, hypertension, and obesity. She has no history of any prior device tachytherapies.        She was previously followed in EP clinic with Dr. Serrano and is followed in the Advanced Heart Failure Clinic with Dr. Euceda. She tells me that she has a veery strong family history of cardiomyopathy, as her older brother  at age 41, her younger brother required cardiac transplantation at age 36, and her mother is presently living with congestive heart failure at age 60. She personally has no history of any prior arrhythmias, and has no personal history of sudden cardiac death. She has never required device tachytherapies on her current generator, nor on her prior generator. She is being considered for possible advanced options including LVAD versus cardiac transplantation.     In discussion with Ms. Silva today, she tells me that she is feeling overall quite well. She  denies any episodes of dizziness, lightheadedness, syncope/presyncope, chest pain or chest discomfort, palpitations, nausea or vomiting, orthopnea, or PND. She reports baseline mild shortness of breath and dyspnea with exertion that she feels has remained stable. She can climb more than one flight of stairs prior to needing to take a break and continues to attempt to increase her level of physical activity. She is able to garden and reports lifting heavy pots with no limitation.     Review of Systems:  Review of Systems   Constitutional:  Negative for activity change.   HENT:  Negative for nasal congestion, nosebleeds, postnasal drip, rhinorrhea, sinus pressure/congestion, sneezing and sore throat.    Respiratory:  Positive for shortness of breath. Negative for apnea, cough, chest tightness and wheezing.    Cardiovascular:  Negative for chest pain, palpitations and leg swelling.   Gastrointestinal:  Negative for abdominal distention, abdominal pain, blood in stool, change in bowel habit, constipation, diarrhea, nausea and vomiting.   Genitourinary:  Negative for dysuria and hematuria.   Musculoskeletal:  Negative for gait problem.   Neurological:  Negative for dizziness, seizures, syncope, weakness, light-headedness, headaches and coordination difficulties.        PAST HISTORY:     Past Medical History:   Diagnosis Date    Cardiomyopathy     CHF (congestive heart failure)     Hypertension        Past Surgical History:   Procedure Laterality Date    INSERTION OF PACEMAKER      REPLACEMENT OF IMPLANTABLE CARDIOVERTER-DEFIBRILLATOR (ICD) GENERATOR N/A 9/9/2024    Procedure: REPLACEMENT, ICD GENERATOR;  Surgeon: Christian Serrano MD;  Location: [facility];  Service: Cardiology;  Laterality: N/A;  LAURIE, S-ICD gen change, BSci, MAC, DM, 3prep       Family History:  Family History   Problem Relation Name Age of Onset    Hypertension Mother      Cancer Mother      Hypertension Father         Social History:  She  reports  "that she has never smoked. She has never used smokeless tobacco. She reports current alcohol use. She reports that she does not use drugs.      MEDICATIONS & ALLERGIES:     Review of patient's allergies indicates:  No Known Allergies    Medications Ordered Prior to Encounter[1]     OBJECTIVE:     Vital Signs:  BP (!) 130/90 (Patient Position: Sitting)   Pulse 63   Ht 5' 1" (1.549 m)   Wt 88 kg (194 lb 0.1 oz)   LMP 03/06/2022   BMI 36.66 kg/m²     Physical Exam:  Physical Exam  Constitutional:       General: She is not in acute distress.     Appearance: Normal appearance. She is obese. She is not ill-appearing or diaphoretic.      Comments: Well-appearing young woman in NAD.   HENT:      Head: Normocephalic and atraumatic.      Nose: Nose normal.      Mouth/Throat:      Mouth: Mucous membranes are moist.      Pharynx: Oropharynx is clear.   Eyes:      Pupils: Pupils are equal, round, and reactive to light.   Cardiovascular:      Rate and Rhythm: Normal rate and regular rhythm.      Pulses: Normal pulses.      Heart sounds: Normal heart sounds. No murmur heard.     No friction rub. No gallop.      Comments: Left midaxillary incision site and subxiphoid incision sites are in excellent repair.   Pulmonary:      Effort: Pulmonary effort is normal. No respiratory distress.      Breath sounds: Normal breath sounds. No wheezing, rhonchi or rales.   Chest:      Chest wall: No tenderness.   Abdominal:      General: There is no distension.      Palpations: Abdomen is soft.      Tenderness: There is no abdominal tenderness.   Musculoskeletal:         General: No swelling or tenderness.      Cervical back: Normal range of motion.      Right lower leg: No edema.      Left lower leg: No edema.   Skin:     General: Skin is warm and dry.      Findings: No erythema, lesion or rash.   Neurological:      General: No focal deficit present.      Mental Status: She is alert and oriented to person, place, and time. Mental status is " at baseline.      Motor: No weakness.      Gait: Gait normal.   Psychiatric:         Mood and Affect: Mood normal.         Behavior: Behavior normal.          Laboratory Data:  Lab Results   Component Value Date    WBC 7.8 12/11/2023    HGB 12.0 12/11/2023    HCT 39.3 12/11/2023    MCV 85 02/23/2023     12/11/2023     Lab Results   Component Value Date    GLU 83 02/23/2023     02/23/2023    K 3.7 02/23/2023     02/23/2023    CO2 23 02/23/2023    BUN 9 02/23/2023    CREATININE 0.7 02/23/2023    CALCIUM 9.4 02/23/2023     Lab Results   Component Value Date    INR 1.0 11/16/2017       Pertinent Cardiac Data:  Personal interpretation of today's ECG: Normal sinus rhythm with rate of 63 bpm,  ms, QRS 84 ms, QT/QTc 448/458 ms.     Resting 2D Transthoracic Echocardiogram - 3/21/2017:    1 - Severely depressed left ventricular systolic function (EF 20-25%).     2 - Mild left atrial enlargement.     3 - Low normal to mildly depressed right ventricular systolic function .     4 - The estimated PA systolic pressure is 26 mmHg.     5 - Mild tricuspid regurgitation.     Resting 2D Transthoracic Echocardiogram - 9/15/2017:    1 - Mild to moderate left ventricular enlargement.     2 - Severely depressed left ventricular systolic function (EF 25-30%).     3 - Normal right ventricular systolic function .     4 - Impaired LV relaxation, normal LAP (grade 1 diastolic dysfunction).     5 - Mild left atrial enlargement.     6 - The estimated PA systolic pressure is 22 mmHg.     Resting 2D Transthoracic Echocardiogram - 12/5/2018:  Mild left ventricular enlargement.  Moderately decreased left ventricular systolic function. The estimated ejection fraction is 30%  Grade I (mild) left ventricular diastolic dysfunction consistent with impaired relaxation.  Low normal right ventricular systolic function.  Normal left atrial pressure.  Trace mitral regurgitation.  Trace tricuspid regurgitation.  Normal central venous  pressure (3 mm Hg).  The estimated PA systolic pressure is 18.84 mm Hg    Resting 2D Transthoracic Echocardiogram - 1/13/2021:  The left ventricle is moderately enlarged with eccentric hypertrophy and severely decreased systolic function. The estimated ejection fraction is 25%, Basal segments appear more hypokinetic in a non ischemic distribution.  Grade I left ventricular diastolic dysfunction.  Normal right ventricular size with normal right ventricular systolic function.  Normal central venous pressure (3 mmHg).    Resting 2D Transthoracic Echocardiogram - 2/25/2022:  The left ventricle is moderately enlarged with moderate eccentric hypertrophy.  The left ventricular systolic function is moderately decreased (estimated ejection fraction is 30%).  Grade I left ventricular diastolic dysfunction.  Normal right ventricular size with normal systolic function.  Normal valvular morphology and function.  Normal central venous pressure (estimated RA pressure is 3 mmHg).    Resting 2D Transthoracic Echocardiogram - 3/13/2025:    Left Ventricle: The left ventricle is at the upper limits of normal in size. Normal wall thickness. Moderate global hypokinesis present. There is mildly reduced systolic function with a visually estimated ejection fraction of 40 - 45%. Ejection fraction is approximately 43%. Quantitated ejection fraction is 44%. There is normal diastolic function.    Right Ventricle: Systolic function is normal.    IVC/SVC: Normal venous pressure at 3 mmHg.    Personal interpretation of today's Device Interrogation: Personal review of her device interrogation report ([employer] MRI S-ICD, implanted 9/9/2024) reveals adequate battery longevity with an estimated 95% of battery life remaining. Her lead was interrogated with acceptable and stable lead parameters. She is presently AS-VS, with underlying normal sinus rhythm, rate of 77 bpm. Her system impedance is 90 Ohms with primary configuration. SMART  pass is on. She has not recorded any sustained or nonsustained events of ventricular arrhythmias, and has never required tachytherapies over the lifetime of the device with the exception of DFTs at implantation.       ASSESSMENT & PLAN:   Ms. 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 She has a past medical history significant for nonischemic postpartum cardiomyopathy - diagnosed in 1/2017 following the birth of her fourth child, with persistently reduced systolic function despite compliance with guideline-directed medical therapy, most recent LVEF 40-45%, s/p implantation of a [employer] subcutaneous ICD initially on 11/16/2017 with a recent generator change on 9/9/2024, hypertension, and obesity. She has no history of any prior device tachytherapies.        - On device interrogation in-office today, she has a normally functioning [employer] subcutaneous ICD. She has adequate battery longevity with an estimated 95% of battery life remaining. Her lead was interrogated with acceptable and stable lead parameters. She is presently AS-VS, with underlying normal sinus rhythm, rate of 77 bpm. Her system impedance is 90 Ohms with primary configuration. SMART pass is on. She has not recorded any sustained or nonsustained events of ventricular arrhythmias, and has never required tachytherapies over the lifetime of the device with the exception of DFTs at implantation.  - She has a narrow QRSd of 84ms. We reviewed the results of her recent repeat resting echocardiogram revealing moderate global hypokinesis with mildly reduced systolic ejection fraction with an estimated LVEF of 40-45% with normal diastolic function. She has no present pacing  indication, and does not require device upgrade at this time.   - She will remain on carvedilol 25mg po BID, in addition to guideline-directed medical therapy as per the Advanced Heart Failure team.   - She will continue to follow with her PCP, general cardiology, and Advanced Heart Failure team for ongoing evaluation and recommendations regarding her comorbid conditions.       This patient will return to clinic in six months with repeat remote transmissions in the interim. All questions and concerns were addressed at this encounter. Total time spent in this patient encounter: 42 minutes.     Signing Physician:       NELI Stock MD  Electrophysiology Attending         [1]   Current Outpatient Medications on File Prior to Visit   Medication Sig Dispense Refill    amLODIPine (NORVASC) 5 MG tablet Take 1 tablet (5 mg total) by mouth once daily. 30 tablet 11    carvedilol (COREG) 25 MG tablet Take 25 mg by mouth 2 (two) times daily with meals.      famotidine (PEPCID) 20 MG tablet SMARTSI Tablet(s) By Mouth Every Evening      furosemide (LASIX) 20 MG tablet Take 20 mg by mouth every other day.       sacubitril-valsartan (ENTRESTO)  mg per tablet Take 1 tablet by mouth 2 (two) times daily. 60 tablet 11    spironolactone (ALDACTONE) 50 MG tablet Take 1 tablet (50 mg total) by mouth once daily. 90 tablet 3    traZODone (DESYREL) 50 MG tablet Take 25-50 mg by mouth nightly as needed.      albuterol-ipratropium (DUO-NEB) 2.5 mg-0.5 mg/3 mL nebulizer solution Inhale 3 mLs into the lungs every 6 (six) hours as needed. (Patient not taking: Reported on 3/13/2025)      empagliflozin (JARDIANCE) 10 mg tablet Take 10 mg by mouth once daily. (Patient not taking: Reported on 3/13/2025)      meclizine (ANTIVERT) 25 mg tablet Take 1 tablet (25 mg total) by mouth 3 (three) times daily as needed. (Patient not taking: Reported on 3/13/2025) 30 tablet 0    prochlorperazine (COMPAZINE) 10 MG tablet Take 1 tablet (10 mg total)  by mouth every 6 (six) hours as needed. (Patient not taking: Reported on 3/13/2025) 30 tablet 0    traMADoL (ULTRAM) 50 mg tablet Take 1 tablet (50 mg total) by mouth every 6 (six) hours as needed for Pain. (Patient not taking: Reported on 3/13/2025) 20 tablet 0     No current facility-administered medications on file prior to visit.

## 2025-03-18 ENCOUNTER — TELEPHONE (OUTPATIENT)
Dept: TRANSPLANT | Facility: CLINIC | Age: 40
End: 2025-03-18

## 2025-03-26 ENCOUNTER — TELEPHONE (OUTPATIENT)
Dept: TRANSPLANT | Facility: CLINIC | Age: 40
End: 2025-03-26
Payer: MEDICARE

## 2025-03-26 DIAGNOSIS — I50.42 CHRONIC COMBINED SYSTOLIC AND DIASTOLIC HEART FAILURE: Primary | ICD-10-CM

## 2025-03-26 NOTE — TELEPHONE ENCOUNTER
Spoke with patient regarding referral from Dr. Stock for advanced heart failure. Patient appointment made on 4/1/25 with labs 1 hour prior to appointment. Patient verbalized understanding and agrees with appointment

## 2025-03-28 ENCOUNTER — TELEPHONE (OUTPATIENT)
Dept: TRANSPLANT | Facility: CLINIC | Age: 40
End: 2025-03-28
Payer: MEDICARE

## 2025-04-01 ENCOUNTER — LAB VISIT (OUTPATIENT)
Dept: LAB | Facility: HOSPITAL | Age: 40
End: 2025-04-01
Attending: INTERNAL MEDICINE
Payer: MEDICARE

## 2025-04-01 ENCOUNTER — OFFICE VISIT (OUTPATIENT)
Dept: TRANSPLANT | Facility: CLINIC | Age: 40
End: 2025-04-01
Payer: MEDICARE

## 2025-04-01 VITALS
BODY MASS INDEX: 35.88 KG/M2 | WEIGHT: 190.06 LBS | SYSTOLIC BLOOD PRESSURE: 125 MMHG | HEIGHT: 61 IN | HEART RATE: 71 BPM | DIASTOLIC BLOOD PRESSURE: 81 MMHG

## 2025-04-01 DIAGNOSIS — I50.42 CHRONIC COMBINED SYSTOLIC AND DIASTOLIC HEART FAILURE: ICD-10-CM

## 2025-04-01 DIAGNOSIS — R07.9 CHEST PAIN, UNSPECIFIED TYPE: Primary | ICD-10-CM

## 2025-04-01 LAB
ALBUMIN SERPL BCP-MCNC: 3.6 G/DL (ref 3.5–5.2)
ALP SERPL-CCNC: 68 UNIT/L (ref 40–150)
ALT SERPL W/O P-5'-P-CCNC: 10 UNIT/L (ref 10–44)
ANION GAP (OHS): 10 MMOL/L (ref 8–16)
AST SERPL-CCNC: 13 UNIT/L (ref 11–45)
BILIRUB SERPL-MCNC: 0.2 MG/DL (ref 0.1–1)
BUN SERPL-MCNC: 13 MG/DL (ref 6–20)
CALCIUM SERPL-MCNC: 8.9 MG/DL (ref 8.7–10.5)
CHLORIDE SERPL-SCNC: 103 MMOL/L (ref 95–110)
CO2 SERPL-SCNC: 26 MMOL/L (ref 23–29)
CREAT SERPL-MCNC: 0.6 MG/DL (ref 0.5–1.4)
GFR SERPLBLD CREATININE-BSD FMLA CKD-EPI: >60 ML/MIN/1.73/M2
GLUCOSE SERPL-MCNC: 73 MG/DL (ref 70–110)
MAGNESIUM SERPL-MCNC: 1.8 MG/DL (ref 1.6–2.6)
POTASSIUM SERPL-SCNC: 3.4 MMOL/L (ref 3.5–5.1)
PROT SERPL-MCNC: 8 GM/DL (ref 6–8.4)
SODIUM SERPL-SCNC: 139 MMOL/L (ref 136–145)
TSH SERPL-ACNC: 0.67 UIU/ML (ref 0.4–4)

## 2025-04-01 PROCEDURE — 4010F ACE/ARB THERAPY RXD/TAKEN: CPT | Mod: CPTII,S$GLB,, | Performed by: INTERNAL MEDICINE

## 2025-04-01 PROCEDURE — 36415 COLL VENOUS BLD VENIPUNCTURE: CPT

## 2025-04-01 PROCEDURE — 83880 ASSAY OF NATRIURETIC PEPTIDE: CPT

## 2025-04-01 PROCEDURE — 1160F RVW MEDS BY RX/DR IN RCRD: CPT | Mod: CPTII,S$GLB,, | Performed by: INTERNAL MEDICINE

## 2025-04-01 PROCEDURE — 82040 ASSAY OF SERUM ALBUMIN: CPT

## 2025-04-01 PROCEDURE — 83735 ASSAY OF MAGNESIUM: CPT

## 2025-04-01 PROCEDURE — 82247 BILIRUBIN TOTAL: CPT

## 2025-04-01 PROCEDURE — 3079F DIAST BP 80-89 MM HG: CPT | Mod: CPTII,S$GLB,, | Performed by: INTERNAL MEDICINE

## 2025-04-01 PROCEDURE — 99999 PR PBB SHADOW E&M-EST. PATIENT-LVL IV: CPT | Mod: PBBFAC,,, | Performed by: INTERNAL MEDICINE

## 2025-04-01 PROCEDURE — 3074F SYST BP LT 130 MM HG: CPT | Mod: CPTII,S$GLB,, | Performed by: INTERNAL MEDICINE

## 2025-04-01 PROCEDURE — 84443 ASSAY THYROID STIM HORMONE: CPT

## 2025-04-01 PROCEDURE — 99205 OFFICE O/P NEW HI 60 MIN: CPT | Mod: S$GLB,,, | Performed by: INTERNAL MEDICINE

## 2025-04-01 PROCEDURE — 3008F BODY MASS INDEX DOCD: CPT | Mod: CPTII,S$GLB,, | Performed by: INTERNAL MEDICINE

## 2025-04-01 PROCEDURE — 1159F MED LIST DOCD IN RCRD: CPT | Mod: CPTII,S$GLB,, | Performed by: INTERNAL MEDICINE

## 2025-04-01 RX ORDER — FUROSEMIDE 20 MG/1
20 TABLET ORAL EVERY OTHER DAY
Qty: 45 TABLET | Refills: 3 | Status: SHIPPED | OUTPATIENT
Start: 2025-04-01 | End: 2025-04-01

## 2025-04-01 RX ORDER — SPIRONOLACTONE 50 MG/1
50 TABLET, FILM COATED ORAL DAILY
Qty: 90 TABLET | Refills: 3 | Status: SHIPPED | OUTPATIENT
Start: 2025-04-01

## 2025-04-01 RX ORDER — SERTRALINE HYDROCHLORIDE 50 MG/1
50 TABLET, FILM COATED ORAL EVERY MORNING
COMMUNITY
Start: 2025-03-10

## 2025-04-01 RX ORDER — AMLODIPINE BESYLATE 5 MG/1
5 TABLET ORAL DAILY
Qty: 90 TABLET | Refills: 3 | Status: SHIPPED | OUTPATIENT
Start: 2025-04-01

## 2025-04-01 RX ORDER — CARVEDILOL 25 MG/1
25 TABLET ORAL 2 TIMES DAILY WITH MEALS
Qty: 180 TABLET | Refills: 3 | Status: SHIPPED | OUTPATIENT
Start: 2025-04-01

## 2025-04-01 NOTE — PROGRESS NOTES
Subjective:   Initial evaluation of heart failure    HPI:  Ms. Silva is a 39 y.o. year old female who has presents to be considered for advanced surgical options (LVAD/OHT). 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 Patient had progressively improved since I saw her back then, and then went a few years in between without returning to see us. She was being followed by Dr. Knight in [place], but insurance changes led to her flipping to Dr. Conley at [facility].     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 Not had LHC before.     Regarding family hx of heart failure, actually has several 1st degree relatives with CHF, including sister and brother that was transplanted in [place] last year. Additionally mother with CHF.     TTE 03/13/2025:    Left Ventricle: The left ventricle is at the upper limits of normal in size. Normal wall thickness. Moderate global hypokinesis present. There is mildly reduced systolic function with a visually estimated ejection fraction of 40 - 45%. Ejection fraction is approximately 43%. Quantitated ejection fraction is 44%. There is normal diastolic function.    Right Ventricle: Systolic function is normal.    IVC/SVC: Normal venous pressure at 3 mmHg.    02/25/2022 TTE:    The left ventricle is moderately enlarged with moderate eccentric hypertrophy.  The left ventricular systolic function is moderately decreased (estimated ejection fraction is 30%).  Grade I left ventricular diastolic dysfunction.  Normal right ventricular size with normal systolic function.  Normal valvular morphology and function.  Normal central venous pressure (estimated  RA pressure is 3 mmHg).    TTE 09/15/17:    1 - Mild to moderate left ventricular enlargement.     2 - Severely depressed left ventricular systolic function (EF 25-30%).     3 - Normal right ventricular systolic function .     4 - Impaired LV relaxation, normal LAP (grade 1 diastolic dysfunction).     5 - Mild left atrial enlargement.     6 - The estimated PA systolic pressure is 22 mmHg.     CPX 06/19/17:  1)  Resting spirometry reveals an FVC = 2.2L which is 69% of predicted, an FEV1 of 1.9L, which is 66% of predicted and an FEV1/FVC ratio of 83%. The MVV = 74 L/min, which is 72% of predicted.  2) The respiratory exchange ratio (RER) was 1.19, suggesting a good effort.  3) The breathing reserve is calculated at 21%, which is borderline reduced. Oxygen saturation with exercise remained normal. Due to the absence of symptoms of dyspnea, this reduction in breathing reserve may be due to a sub-clinical ventilatory defect versus a falsely reduced baseline MVV   4) The PkVO2 was 20.1 ml/kg/min which is 54% of predicted equating to a functional capacity of 5.7 METS indicating moderate to severe functional impairment.   5) The anaerobic threshold (AT), which occurred at a heart rate of 137bpm, was 14.4 ml/kg/min, which is 39% of the predicted VO2 and is reduced.   6) The PkVO2 Lean was 26.73 ml/kg of lean body weight/min indicating a good prognosis in heart failure.   7) The VE/VCO2 decreased by -43% from rest to AT. The VE/VCO2 [place] was 25.5.  The Resting PetCO2 was 35.8.      Review of Systems   Constitutional: Positive for weight gain. Negative for chills, decreased appetite, diaphoresis, fever, malaise/fatigue and weight loss.   Eyes:  Negative for visual disturbance.   Cardiovascular:  Negative for chest pain, cyanosis, dyspnea on exertion, irregular heartbeat, leg swelling, near-syncope, orthopnea (down to 1 pillow at night, no PND), palpitations, paroxysmal nocturnal dyspnea and syncope.   Respiratory:  Negative  "for cough, shortness of breath, sleep disturbances due to breathing, snoring, sputum production and wheezing.    Hematologic/Lymphatic: Negative for adenopathy and bleeding problem. Does not bruise/bleed easily.   Skin:  Negative for color change, poor wound healing, rash, skin cancer and suspicious lesions.   Musculoskeletal:  Negative for back pain, falls, gout, joint pain and muscle weakness.   Gastrointestinal:  Negative for bloating, abdominal pain, anorexia, constipation, diarrhea, heartburn, hematemesis, hematochezia, melena, nausea and vomiting.   Genitourinary:  Negative for nocturia and urgency.   Neurological:  Negative for excessive daytime sleepiness, dizziness, focal weakness, headaches, light-headedness, paresthesias, tremors and weakness.   Psychiatric/Behavioral:  Negative for depression and memory loss. The patient does not have insomnia and is not nervous/anxious.        Objective:   Blood pressure 125/81, pulse 71, height 5' 1" (1.549 m), weight 86.2 kg (190 lb 0.6 oz), last menstrual period 03/06/2022.body mass index is 35.91 kg/m².    Physical Exam  Vitals and nursing note reviewed.   Constitutional:       General: She is not in acute distress.     Appearance: Normal appearance. She is well-developed. She is not diaphoretic.   HENT:      Head: Normocephalic and atraumatic.      Right Ear: External ear normal.      Left Ear: External ear normal.      Nose: Nose normal.      Mouth/Throat:      Pharynx: No oropharyngeal exudate.   Eyes:      General: No scleral icterus.     Pupils: Pupils are equal, round, and reactive to light.   Neck:      Vascular: JVD (6cm H2O) present. No hepatojugular reflux.      Trachea: Trachea normal.   Cardiovascular:      Rate and Rhythm: Normal rate and regular rhythm.      Chest Wall: PMI is not displaced.      Pulses: Normal pulses and intact distal pulses.      Heart sounds: Normal heart sounds, S1 normal and S2 normal.      No gallop.   Pulmonary:      Effort: " Pulmonary effort is normal.      Breath sounds: Normal breath sounds. No wheezing, rhonchi or rales.   Abdominal:      General: Bowel sounds are normal. There is no distension.      Palpations: Abdomen is soft. There is no mass.      Tenderness: There is no abdominal tenderness.   Musculoskeletal:      Cervical back: Normal range of motion and neck supple.   Lymphadenopathy:      Cervical: No cervical adenopathy.   Skin:     General: Skin is warm and dry.      Nails: There is no clubbing.   Neurological:      Mental Status: She is alert and oriented to person, place, and time.      Gait: Gait normal.   Psychiatric:         Speech: Speech normal.       Labs:      Chemistry        Component Value Date/Time     04/01/2025 1259     02/23/2023 1610    K 3.4 (L) 04/01/2025 1259    K 3.7 02/23/2023 1610     04/01/2025 1259     02/23/2023 1610    CO2 26 04/01/2025 1259    CO2 23 02/23/2023 1610    BUN 13 04/01/2025 1259    CREATININE 0.6 04/01/2025 1259    GLU 83 02/23/2023 1610        Component Value Date/Time    CALCIUM 8.9 04/01/2025 1259    CALCIUM 9.4 02/23/2023 1610    ALKPHOS 68 04/01/2025 1259    ALKPHOS 67 02/23/2023 1610    AST 13 04/01/2025 1259    AST 16 02/23/2023 1610    ALT 10 04/01/2025 1259    ALT 12 02/23/2023 1610    BILITOT 0.2 04/01/2025 1259    BILITOT 0.5 02/23/2023 1610          Lab Results   Component Value Date    WBC 7.8 12/11/2023    HGB 12.0 12/11/2023    HCT 39.3 12/11/2023    MCV 85 02/23/2023     12/11/2023     BNP   Date Value Ref Range Status   02/12/2024 17 15 - 111 PG/ML Final   06/29/2023 23 15 - 111 PG/ML Final   03/18/2023 31 15 - 111 PG/ML Final   04/02/2018 77 0 - 99 pg/mL Final     Comment:     Values of less than 100 pg/ml are consistent with non-CHF populations.   09/15/2017 57 0 - 99 pg/mL Final     Comment:     Values of less than 100 pg/ml are consistent with non-CHF populations.   06/19/2017 48 0 - 99 pg/mL Final     Comment:     Values of less  than 100 pg/ml are consistent with non-CHF populations.     Labs were reviewed with the patient.    Assessment:      1. Chest pain, unspecified type    2. Chronic combined systolic and diastolic heart failure          Plan:   Patient is overall doing well, following with EP but has not been as consistent with cardiologist.   With exertional chest discomfort will go ahead and get stresstest, with BMI over 35, feel PET stress will be most appropriate.   Recommend she re-establish care with cardiologist locally, and additionally we will see her every 6-12 months.   Do recommend she restart jardiance, she had started it but felt more tired when taking it so stopped May of 2024.   Recommended if she takes jardiance she stop her every other day lasix, which may have been contributing to her fatigue.   Labs today with low potassium, will make sure she is taking aldactone 50mg daily.  May need to take a daily potassium with adding jardiance to her regimen.   Patient is now NYHA II ACC stage B  Recommend 2 gram sodium restriction and 2000cc fluid restriction.  Encourage physical activity with graded exercise program.  Requested patient to weigh themselves daily, and to notify us if their weight increases by more than 3 lbs in 1 day or 5 lbs in 1 week.     Terra Euceda MD

## 2025-04-01 NOTE — LETTER
April 1, 2025        Kendall Donnelly Jr.  [address]  [place] [address]  Phone: [phone]  Fax: [phone]             [facility]  [address]  [facility] [address]  Phone: [phone]   Patient: Cj Silva   MR Number: [id number]   YOB: 1985   Date of Visit: 4/1/2025       Dear Dr. Kendall Donnelly Jr.    Thank you for referring Cj Silva to me for evaluation. Attached you will find relevant portions of my assessment and plan of care.    If you have questions, please do not hesitate to call me. I look forward to following Cj Silva along with you.    Sincerely,    Terra Euceda MD    Enclosure    If you would like to receive this communication electronically, please contact [email or web address] or [phone] to request [employer] access.    [employer] is a tool which provides read-only access to select patient information with whom you have a relationship. Its easy to use and provides real time access to review your patients record including encounter summaries, notes, results, and demographic information.    If you feel you have received this communication in error or would no longer like to receive these types of communications, please e-mail [email or web address]

## 2025-04-03 LAB — NT-PROBNP SERPL IA-MCNC: <36 PG/ML

## 2025-04-09 ENCOUNTER — TELEPHONE (OUTPATIENT)
Dept: TRANSPLANT | Facility: CLINIC | Age: 40
End: 2025-04-09
Payer: MEDICARE

## 2025-04-10 ENCOUNTER — TELEPHONE (OUTPATIENT)
Dept: TRANSPLANT | Facility: CLINIC | Age: 40
End: 2025-04-10
Payer: MEDICARE

## 2025-04-10 NOTE — TELEPHONE ENCOUNTER
4/10/25 - Received below message from patient, returning Melanie's call from yesterday.    See FARRAH Hobbs RN's NN 4/9/25.    ----- Message from Mariya sent at 4/10/2025  4:41 PM CDT -----  Regarding: Medication  Pt [phone] says Melanie called her yesterday needing to know the dosage and how often she takes her Aldactone. The dosage is 50 mg and she takes this medication once daily.Thanks    Returned call/spoke with patient who confirmed she is taking Aldactone 50 mg daily.    Will ask FARRAH Hobbs RN to F/U with Dr. Euceda today, as she is out of the office.

## 2025-04-10 NOTE — TELEPHONE ENCOUNTER
4/9/25  7:00 pm:  F/U on yesterdays nurse lab reminder  Pt had labs late this afternoon and are in epic    See  4/1 clinic note w/ indication for this lab I am copying here:  Labs today with low potassium, will make sure she is taking aldactone 50mg daily.     Just called pt  NaINESM stating my name, w/ Dr. Sarabia [facility] , day, date, time and calling to let her know her lab results from this afternoon showed an improved potassium level of 3.8  which is now normal and other lab results good and normal as well.     Also lvm stating Dr. Euceda wanted to confirm the dose of Spironolactone she is taking and asked she call back tomorrow to provide us that information  Left this office phone number and included I am out of the office tomorrow, however my coworker, Nayla is able to take this information

## 2025-04-11 ENCOUNTER — TELEPHONE (OUTPATIENT)
Dept: TRANSPLANT | Facility: CLINIC | Age: 40
End: 2025-04-11
Payer: MEDICARE

## 2025-04-11 NOTE — TELEPHONE ENCOUNTER
Following up as Charly, RN asked -see her not yesterday    Dr. Euceda in [place] today seeing pt    Sent a phone message to her letting her know of pts improved K from 3.4 on April 1  to 3.8 yesterday and pt confirmed she is taking aldactone once daily       4/11/25  1120 am  TORB: Dr. Euceda/Tawanna, RN :  3.8  is good  If she wants to eat a little more K food would be better    Called pt at this time and informed of Dr. Sarabia response and she would recommend her increase K food intake   We also discussed her recent and past k level    Pt is already eating foods as : bananas citrus fruits, dark green leafy daily  Encouraged pt to continue to do so and maybe not increase them some each day  pt understood

## 2025-04-28 ENCOUNTER — CLINICAL SUPPORT (OUTPATIENT)
Dept: CARDIOLOGY | Facility: HOSPITAL | Age: 40
End: 2025-04-28
Payer: MEDICARE

## 2025-04-28 ENCOUNTER — CLINICAL SUPPORT (OUTPATIENT)
Dept: CARDIOLOGY | Facility: HOSPITAL | Age: 40
End: 2025-04-28
Attending: STUDENT IN AN ORGANIZED HEALTH CARE EDUCATION/TRAINING PROGRAM
Payer: MEDICARE

## 2025-04-28 DIAGNOSIS — I50.9 HEART FAILURE, UNSPECIFIED: ICD-10-CM

## 2025-04-28 DIAGNOSIS — I42.9 CARDIOMYOPATHY, UNSPECIFIED: ICD-10-CM

## 2025-04-28 DIAGNOSIS — Z95.810 PRESENCE OF AUTOMATIC (IMPLANTABLE) CARDIAC DEFIBRILLATOR: ICD-10-CM

## 2025-04-28 PROCEDURE — 93296 REM INTERROG EVL PM/IDS: CPT | Performed by: STUDENT IN AN ORGANIZED HEALTH CARE EDUCATION/TRAINING PROGRAM

## 2025-04-29 LAB
OHS CV AF BURDEN PERCENT: < 1
OHS CV DC REMOTE DEVICE TYPE: NORMAL

## 2025-06-04 ENCOUNTER — TELEPHONE (OUTPATIENT)
Dept: GENETICS | Facility: CLINIC | Age: 40
End: 2025-06-04
Payer: MEDICARE

## 2025-06-15 PROBLEM — Z95.810 ICD (IMPLANTABLE CARDIOVERTER-DEFIBRILLATOR) IN PLACE: Status: ACTIVE | Noted: 2025-06-15

## 2025-06-15 PROBLEM — I50.42 CHRONIC COMBINED SYSTOLIC AND DIASTOLIC HEART FAILURE: Status: ACTIVE | Noted: 2025-06-15

## 2025-06-15 PROBLEM — Z45.02 ICD (IMPLANTABLE CARDIOVERTER-DEFIBRILLATOR) BATTERY DEPLETION: Status: RESOLVED | Noted: 2024-09-09 | Resolved: 2025-06-15

## 2025-06-15 PROBLEM — I10 PRIMARY HYPERTENSION: Status: ACTIVE | Noted: 2025-06-15

## 2025-07-08 NOTE — PROGRESS NOTES
TELEMEDICINE VIDEO VISIT     The patient location is: [facility]  The chief complaint leading to consultation is: DCM, HFrEF  Total time spent with patient: 43 minutes  Visit type: Virtual visit with synchronous audio and video      Each patient to whom he or she provides medical services by telemedicine is: (1) informed of the relationship between the physician and patient and the respective role of any other health care provider with respect to management of the patient; and (2) notified that he or she may decline to receive medical services by telemedicine and may withdraw from such care at any time.    NEW PATIENT GENETIC COUNSELING CONSULTATION     [facility]  [address] [place] [id number]    DATE OF CONSULTATION: 07/08/2025    REFERRING PHYSICIAN: Terra Euceda MD    REASON FOR CONSULTATION: We are requested by Dr. Terra Euceda to consult on Cj Silva regarding postpartum dilated cardiomyopathy and heart failure with reduced ejection fraction (HFrEF). Cj Silva is unaccompanied for today's visit.      HISTORY OF PRESENT ILLNESS:  Cj Silva is a 39 y.o. female with postpartum dilated cardiomyopathy and heart failure with reduced ejection fraction (HFrEF).    Ms. Silva reports initial cardiac concerns began in June 2016 following the birth of her fourth child when she started to experience chest pain and shortness of breath. She came to medical attention in January 2017 and was found from cardiac workup to have dilated cardiomyopathy (DCM) which progressed to heart failure. LVEF from echocardiogram completed 02/17/17 was 20-25%. Ms. Silva has more recently re-established care with the advanced heart failure team at [facility] (Dr. Euceda) and is noted to have improved since she was initially seen in 2017, her most recent echocardiogram from 03/13/25 notes EF is approximately 43%. From record review, she is currently  being considered for possible advanced options including LVAD versus cardiac transplantation.     Ms. Sivla is also followed by electrophysiology (Dr. Stock) for surveillance of a [employer] subcutaneous ICD initially implanted 17 for primary prevention with recent generator change on 24; during today's visit Ms. Silva reports her ICD has never fired off and denies any history of arrhythmias, syncope, or presyncope.     Ms. Silva's family history is notable for heart failure in one brother (40yo) who underwent heart transplant last year, her mother who was diagnosed in her 50s, one maternal aunt who  at 33yo from congestive heart failure, and one maternal cousin (45yo). She also reports one of her brothers  at 43yo in his sleep and another brother (27yo) who may possibly have cardiomyopathy, however limited information is available on his health status. Genetic evaluation was recommended in light of Ms. Silva's personal and family history of cardiac issues to determine an underlying genetic etiology which would inform medical management and risk for other family members.     REVIEW OF SYSTEMS:     Ms. Silva reports during today's visit that she has been experiencing chronic headaches ~2x/week over the past several weeks, with some of these headaches occasionally lasting several days. Ms. Silva also reports occasional muscle spasms in her chest and back area. She will be addressing these concerns during her next PCP visit.    MEDICAL HISTORY:    Past Medical History:  Patient Active Problem List    Diagnosis Date Noted    Chronic combined systolic and diastolic heart failure 06/15/2025    Primary hypertension 06/15/2025    ICD (implantable cardioverter-defibrillator) in place 06/15/2025    Severe obesity (BMI 35.0-39.9) with comorbidity 2023    Hypertensive pulmonary vascular disease 2023    Nonischemic cardiomyopathy 2017    CHF with left ventricular  diastolic dysfunction, NYHA class 2 09/15/2017    Peripartum cardiomyopathy 2017       Past Surgical History:  Past Surgical History:   Procedure Laterality Date    INSERTION OF PACEMAKER      REPLACEMENT OF IMPLANTABLE CARDIOVERTER-DEFIBRILLATOR (ICD) GENERATOR N/A 2024    Procedure: REPLACEMENT, ICD GENERATOR;  Surgeon: Christian Serrano MD;  [facility] EP LAB;  Service: Cardiology;  Laterality: N/A;  LAURIE, S-ICD gen change, BSci, MAC, DM, 3prep       Developmental History:  No developmental concerns.   Highest level of education achieved: 10th grade    Family History:      Ms. Silva has four children (20yo, 18yo, 10yo, 10yo); her daughter (10yo) is reported to have delays in speech articulation. She has 11 siblings total. Her oldest brother  at 43yo in his sleep, was reported to have a leaky heart valve. Another brother (38yo) is reported to have heart failure, underwent heart transplant last year. One brother (29yo) is reported to have suspected cardiomyopathy, however limited information is available on his health status.     Ms. Barths mother (61yo) is reported to have a history of breast cancer diagnosed in her 40s and was later diagnosed with congestive heart failure in her 50s. One maternal aunt  at 33yo from congestive heart failure. One maternal uncle  in his 50s-60s, was reported to have nonspecific cardiac issues but required ICD. Another maternal uncle (63yo) has nonspecific cardiac issues. One maternal aunt (63yo) is reported to have had breast cancer diagnosed at 56-56yo. One maternal cousin (43yo) is reported to have congestive heart failure and has a pacemaker. Another maternal cousin (38yo) is reported to have epilepsy. Maternal grandmother  in her 60s. No information is available on maternal grandfather.     Ms. Silva's father (66yo) is reported to have a recent history of seizures initially diagnosed at 64yo of unknown etiology. No cardiac concerns such as  cardiomyopathy or arrhythmia conditions are reported in Ms. Silva's paternal aunts or uncles. Paternal grandparents (80s) have no reported health concerns.     3-generation family history obtained and otherwise negative for history of intellectual disability/developmental delay, birth defects, or 3 or more miscarriages unless otherwise noted above. Consanguinity denied.    Social History:  Lives with her four children. The family resides in [place], [place].    DIAGNOSTIC STUDIES REVIEWED:     PRIOR RADIOLOGY, IMAGING, AND OTHER STUDIES:      Transthoracic echo (TTE) complete (03/13/25):    Left Ventricle: The left ventricle is at the upper limits of normal in size. Normal wall thickness. Moderate global hypokinesis present. There is mildly reduced systolic function with a visually estimated ejection fraction of 40 - 45%. Ejection fraction is approximately 43%. Quantitated ejection fraction is 44%. There is normal diastolic function.    Right Ventricle: Systolic function is normal.    IVC/SVC: Normal venous pressure at 3 mmHg.     Rhythm strip (03/13/25):  Vent. Rate :  63 BPM     Atrial Rate :  63 BPM      P-R Int : 154 ms          QRS Dur :  84 ms       QT Int : 448 ms       P-R-T Axes :  53  74  27 degrees     QTcB Int : 458 ms     Normal sinus rhythm   Normal ECG   When compared with ECG of 09-Sep-2024 09:06,   No significant change was found     EKG 12-lead (09/09/24):  Vent. Rate : 059 BPM     Atrial Rate : 059 BPM      P-R Int : 158 ms          QRS Dur : 090 ms       QT Int : 450 ms       P-R-T Axes : 048 033 042 degrees      QTc Int : 445 ms     Sinus bradycardia   Otherwise normal ECG   When compared with ECG of 09-SEP-2024 09:04,   No significant change was found     2D Echo w/ Color Flow Doppler (02/17/17):   1 - Severely depressed left ventricular systolic function (EF 20-25%).     2 - Mild left atrial enlargement.     3 - Low normal to mildly depressed right ventricular systolic function .     4 - The  estimated PA systolic pressure is 26 mmHg.     5 - Mild tricuspid regurgitation.     ASSESSMENT/DISCUSSION:  Cj Silva is a 39 y.o. female with postpartum dilated cardiomyopathy and heart failure with reduced ejection fraction (HFrEF). She also has a family history of heart failure in one brother, her mother, one maternal aunt, and one maternal cousin.    The possible genetic causes of dilated cardiomyopathy (DCM) were reviewed and summarized below. DCM can also occur for different reasons, some of which are acquired such as ischemia, myocardial infarction, valvular and congenital heart disease, toxins from chemotherapy, thyroid disease, myocarditis, long-standing hypertension, and radiation. It is estimated that 30-50% of idiopathic DCM has a genetic basis. Familial DCM is considered when two or more closely related individuals are diagnosed with DCM.    Genes are sections of DNA with a specific genetic code that determine physical traits, growth, development and susceptibility to disease, among other things. We have two copies of most genes, one is inherited from each parent. Most causes of familial DCM are inherited in an autosomal dominant manner. This implies that males and females are equally affected and that one non-working copy of the gene is sufficient to have the condition. First-degree relatives (parents, siblings, and children) of a person with an autosomal dominant cardiomyopathy each have a 50% chance of having the same genetic risk for cardiomyopathy. Less often, other patterns of inheritance of familial cardiomyopathy have been reported, including autosomal recessive, X-linked manner and mitochondrial inheritance; the risk for family members depends on the pattern of inheritance. Not all individuals in a family who inherit a familial gene mutation will develop cardiomyopathy (non-penetrance); and family members may present differently ranging from sudden death and severe cardiac  compromise to no symptoms (variable expressivity). Guidelines recommend periodic cardiac screening for at-risk family members including echocardiogram, EKG and evaluation by a cardiologist beginning in childhood.    More than 50 genes have been implicated in familial and idiopathic (unexplained) DCM, however, individually, each gene accounts for a minority of familial cases. Genetic testing for DCM is available for many of these in the form of a multigene panel that includes a number of genes that may be helpful in guiding management in the patient and family. Several genes are known to be associated with DCM and an increased risk of cardiac arrhythmias (LMNA, LEE, SCN5A, RBM20, DSP, and PKP2) and others are associated with skeletal muscle weakness (MYH7, LMNA, LEE, EMD, SCGD). Overall, the likelihood of identifying a mutation in a person with DCM is around 25-40%. This means that a negative test result in a person with DCM would not rule out familial cardiomyopathy, since there may be a genetic cause that is not identifiable with our current knowledge and technology.    We reviewed the possible outcomes of genetic testing:    -A positive result (pathogenic variant is found) would confirm an underlying genetic basis for the patients symptoms and additional recommendations would be guided by the specific gene involved. This would provide a highly accurate test for other family members to help clarify their risks.   -A negative result (no variant found) would reduce the likelihood that the specific genes tested are involved, but it would not rule out the possibility that there may be a genetic cause for which testing is not available.   -A variant of unknown significance is an inconclusive result that does not confirm or rule out a diagnosis and is not useful for genetic testing in family members. Additional information may be available over time to clarify an unknown variant.       We also reviewed the Genetic  Information Nondiscrimination Act (SAMIRA) protects individuals from discrimination on the basis of genetic information from medical insurance providers and employers. However, the law does not cover life insurance, disability, or long-term disability insurance. This law also does not apply to certain employers, including members of the [place] , Federal Government employees, and employers with less than 15 employees. Additional information about SAMIRA can be found at https://ginahelp.org/.    Ms. Silva verbalized understanding of the information discussed and consented to proceed with genetic testing (Arrhythmia and Cardiomyopathy Comprehensive Panel) through [employer]'s sponsored Unlock program. A buccal kit will be mailed to her home address for sample collection. Follow-up will be coordinated based on results. She was encouraged to contact our office for any questions or concerns that arise in the meantime.     We also reviewed the family history of breast cancer in Ms. Silva's mother and maternal aunt. We discussed that while testing a relative who was diagnosed with cancer, such as her mother, would be the most informative for the family, the option of meeting with our cancer genetic counseling team for evaluation and consideration of genetic testing for hereditary cancer conditions is available to clarify her risk. Ms. Silva expressed interest in being referred.     RECOMMENDATIONS/PLAN:  [facility] Arrhythmia and Cardiomyopathy Comprehensive  Panel (Unlock); buccal kit mailed to patient  Follow-up pending results  Referral to cancer genetics given family history of breast cancer     REFERENCES:  RANULFO Reinoso., RANDI Hill., MIQUEL Alberto., LEE Canseco., Loki, P. KAMALJIT., FARRAH Nieto., FADY Parra, RANDI Gonsalez., RANDI Sifuentes, & ROSA Baumann. (2018). Genetic Evaluation of Cardiomyopathy-A Heart Failure Society of [place] Practice Guideline. Journal of cardiac failure, 24(5), 281-302.  https://[email or web address]/10.1016/j.cardfail.2018.03.004  ANKITA Zamora., FADY Zavala., CHAVA Medina., TIARA Monaco., UDAY Hall, FARRAH Moreno., TYREE Boateng, FADY Parra, TIARA Fitzpatrick, ROSA Bailey, RANDI Sifuentes, RANULFO Muro., & TIARA Leahy. (2022). Assessment of the Diagnostic Yield of Combined Cardiomyopathy and Arrhythmia Genetic Testing. KIRIT cardiology, 7(9), 966-974. https://[email or web address]/10.1001/jamacardio.2022.2455  Yuri SHRESTHA (2014). Inherited cardiomyopathies. Circulation journal : official journal of the [place] Circulation Society, 78(10), 9200-4326. https://doi.org/10.1253/circj.cj-    TIME SPENT:   Face to Face time with patient: 43 minutes with over 50% spent counseling  103 minutes of total time spent today on the encounter, which includes face to face time and non-face to face time preparing to see the patient (eg, review of tests), Obtaining and/or reviewing separately obtained history, Documenting clinical information in the electronic or other health record, Independently interpreting results (not separately reported) and communicating results to the patient/family/caregiver, or Care coordination (not separately reported).     Ashley Bear MS, [facility]  Certified Genetic Counselor   [facility]    EXTERNAL CC:    Terra Euceda MD

## 2025-07-09 ENCOUNTER — TELEPHONE (OUTPATIENT)
Dept: GENETICS | Facility: CLINIC | Age: 40
End: 2025-07-09
Payer: MEDICARE

## 2025-07-10 ENCOUNTER — OFFICE VISIT (OUTPATIENT)
Dept: GENETICS | Facility: CLINIC | Age: 40
End: 2025-07-10
Payer: MEDICARE

## 2025-07-10 ENCOUNTER — PATIENT MESSAGE (OUTPATIENT)
Dept: HEMATOLOGY/ONCOLOGY | Facility: CLINIC | Age: 40
End: 2025-07-10
Payer: MEDICARE

## 2025-07-10 DIAGNOSIS — I50.42 CHRONIC COMBINED SYSTOLIC AND DIASTOLIC HEART FAILURE: Primary | ICD-10-CM

## 2025-07-10 DIAGNOSIS — Z95.810 ICD (IMPLANTABLE CARDIOVERTER-DEFIBRILLATOR) IN PLACE: ICD-10-CM

## 2025-07-10 DIAGNOSIS — Z80.3 FAMILY HISTORY OF BREAST CANCER: ICD-10-CM

## 2025-07-10 PROCEDURE — 99499 UNLISTED E&M SERVICE: CPT | Mod: 95,,,

## 2025-07-10 PROCEDURE — 96041 GENETIC COUNSELING SVC EA 30: CPT | Mod: 95,,,

## 2025-07-17 ENCOUNTER — PATIENT MESSAGE (OUTPATIENT)
Dept: HEMATOLOGY/ONCOLOGY | Facility: CLINIC | Age: 40
End: 2025-07-17
Payer: MEDICARE

## 2025-07-29 ENCOUNTER — CLINICAL SUPPORT (OUTPATIENT)
Dept: CARDIOLOGY | Facility: HOSPITAL | Age: 40
End: 2025-07-29
Attending: STUDENT IN AN ORGANIZED HEALTH CARE EDUCATION/TRAINING PROGRAM
Payer: MEDICARE

## 2025-07-29 ENCOUNTER — TELEPHONE (OUTPATIENT)
Dept: GENETICS | Facility: CLINIC | Age: 40
End: 2025-07-29
Payer: MEDICARE

## 2025-07-29 ENCOUNTER — CLINICAL SUPPORT (OUTPATIENT)
Dept: CARDIOLOGY | Facility: HOSPITAL | Age: 40
End: 2025-07-29
Payer: MEDICARE

## 2025-07-29 DIAGNOSIS — Z95.810 PRESENCE OF AUTOMATIC (IMPLANTABLE) CARDIAC DEFIBRILLATOR: ICD-10-CM

## 2025-07-29 DIAGNOSIS — I42.9 CARDIOMYOPATHY, UNSPECIFIED: ICD-10-CM

## 2025-07-29 DIAGNOSIS — I50.9 HEART FAILURE, UNSPECIFIED: ICD-10-CM

## 2025-07-29 PROCEDURE — 93295 DEV INTERROG REMOTE 1/2/MLT: CPT | Mod: ,,, | Performed by: STUDENT IN AN ORGANIZED HEALTH CARE EDUCATION/TRAINING PROGRAM

## 2025-07-29 PROCEDURE — 93296 REM INTERROG EVL PM/IDS: CPT | Performed by: STUDENT IN AN ORGANIZED HEALTH CARE EDUCATION/TRAINING PROGRAM

## 2025-07-29 NOTE — TELEPHONE ENCOUNTER
BANDAR informing pt to call office callback number [phone] to schedule genetics appt.     ----- Message from Ashley Bear sent at 7/29/2025  9:58 AM CDT -----  Hello,    Can this patient be scheduled for results with me? I can see her next Monday (8/4) at 1 or 2:30pm for a virtual visit. Also would be okay to offer my Thursday 8/7 at 8:30am slot if she can't do Monday.    Thank you!    Best,  Ashley

## 2025-07-29 NOTE — TELEPHONE ENCOUNTER
Spoke with Pt to schedule in person genetics appt. Pt understood and confirmed appt.         Copied from CRM #[id number]. Topic: General Inquiry - Return Call  >> Jul 29, 2025 10:09 AM Makayla wrote:  Type:  Patient Returning Call    Who Called: Gisselle Christine MA    Who Left Message for Patient:Gisselle Christine MA    Does the patient know what this is regarding?:yes    Would the patient rather a call back or a response via [facility]? Call    Best Call Back Number:[phone]    Additional Information: returning call

## 2025-07-30 LAB
OHS CV AF BURDEN PERCENT: < 1
OHS CV DC REMOTE DEVICE TYPE: NORMAL

## 2025-08-01 ENCOUNTER — TELEPHONE (OUTPATIENT)
Dept: PEDIATRIC NEUROLOGY | Facility: CLINIC | Age: 40
End: 2025-08-01
Payer: MEDICARE

## 2025-08-01 NOTE — PROGRESS NOTES
TELEMEDICINE VIDEO VISIT     The patient location is: [facility]  The chief complaint leading to consultation is: Results disclosure  Total time spent with patient: 12 minutes  Visit type: Virtual visit with synchronous audio and video      Each patient to whom he or she provides medical services by telemedicine is: (1) informed of the relationship between the physician and patient and the respective role of any other health care provider with respect to management of the patient; and (2) notified that he or she may decline to receive medical services by telemedicine and may withdraw from such care at any time.    ESTABLISHED PATIENT GENETIC COUNSELING CONSULTATION     [facility]  [address] [place] [address]    DATE OF CONSULTATION: 08/04/2025    REFERRING PHYSICIAN: No ref. provider found    REASON FOR CONSULTATION: Results disclosure. Cj Silva is unaccompanied for today's visit.      HISTORY OF PRESENT ILLNESS: Cj Silva is a 39 y.o. 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     Ms. Silva was initially seen for genetic counseling on 07/10/25. HPI from initial visit is as follows:    Cj Silva is a 39 y.o. female with postpartum dilated cardiomyopathy and heart failure with reduced ejection fraction (HFrEF).    Ms. Silva reports initial cardiac concerns began in June 2016 following the birth of her fourth child when she started to experience chest pain and shortness of breath. She came to medical attention in January 2017 and was found from cardiac workup to have dilated cardiomyopathy (DCM) which progressed to heart failure. LVEF from echocardiogram completed 02/17/17 was 20-25%. Ms. Silva has more  recently re-established care with the advanced heart failure team at [facility] (Dr. Euceda) and is noted to have improved since she was initially seen in 2017, her most recent echocardiogram from 25 notes EF is approximately 43%. From record review, she is currently being considered for possible advanced options including LVAD versus cardiac transplantation.     Ms. Silva is also followed by electrophysiology (Dr. Stock) for surveillance of a [employer] subcutaneous ICD initially implanted 17 for primary prevention with recent generator change on 24; during today's visit Ms. Silva reports her ICD has never fired off and denies any history of arrhythmias, syncope, or presyncope.     Ms. Silva's family history is notable for heart failure in one brother (38yo) who underwent heart transplant last year, her mother who was diagnosed in her 50s, one maternal aunt who  at 33yo from congestive heart failure, and one maternal cousin (43yo). She also reports one of her brothers  at 43yo in his sleep and another brother (27yo) who may possibly have cardiomyopathy, however limited information is available on his health status. Genetic evaluation was recommended in light of Ms. Silva's personal and family history of cardiac issues to determine an underlying genetic etiology which would inform medical management and risk for other family members.     REVIEW OF SYSTEMS:     Review of Systems was previously collected during initial visit on 07/10/25:    Ms. Silva reports during today's visit that she has been experiencing chronic headaches ~2x/week over the past several weeks, with some of these headaches occasionally lasting several days. Ms. Silva also reports occasional muscle spasms in her chest and back area. She will be addressing these concerns during her next PCP visit.    MEDICAL HISTORY:    Past Medical History:  Patient Active Problem List    Diagnosis Date Noted     Chronic combined systolic and diastolic heart failure 06/15/2025    Primary hypertension 06/15/2025    ICD (implantable cardioverter-defibrillator) in place 06/15/2025    Severe obesity (BMI 35.0-39.9) with comorbidity 2023    Hypertensive pulmonary vascular disease 2023    Nonischemic cardiomyopathy 2017    CHF with left ventricular diastolic dysfunction, NYHA class 2 09/15/2017    Peripartum cardiomyopathy 2017       Past Surgical History:  Past Surgical History:   Procedure Laterality Date    INSERTION OF PACEMAKER      REPLACEMENT OF IMPLANTABLE CARDIOVERTER-DEFIBRILLATOR (ICD) GENERATOR N/A 2024    Procedure: REPLACEMENT, ICD GENERATOR;  Surgeon: Christian Serrano MD;  Location: [facility] EP LAB;  Service: Cardiology;  Laterality: N/A;  LAURIE, S-ICD gen change, BSci, MAC, DM, 3prep       Developmental History:  No developmental concerns.   Highest level of education achieved: 10th grade    Family History:  Family history was previously collected on 07/10/25:      Ms. Silva has four children (18yo, 18yo, 10yo, 8yo); her daughter (8yo) is reported to have delays in speech articulation. She has 11 siblings total. Her oldest brother  at 41yo in his sleep, was reported to have a leaky heart valve. Another brother (38yo) is reported to have heart failure, underwent heart transplant last year. One brother (27yo) is reported to have suspected cardiomyopathy, however limited information is available on his health status.     Ms. Silva's mother (61yo) is reported to have a history of breast cancer diagnosed in her 40s and was later diagnosed with congestive heart failure in her 50s. One maternal aunt  at 35yo from congestive heart failure. One maternal uncle  in his 50s-60s, was reported to have nonspecific cardiac issues but required ICD. Another maternal uncle (63yo) has nonspecific cardiac issues. One maternal aunt (63yo) is reported to have had breast cancer diagnosed at  56-58yo. One maternal cousin (45yo) is reported to have congestive heart failure and has a pacemaker. Another maternal cousin (36yo) is reported to have epilepsy. Maternal grandmother  in her 60s. No information is available on maternal grandfather.     Ms. Silva's father (66yo) is reported to have a recent history of seizures initially diagnosed at 62yo of unknown etiology. No cardiac concerns such as cardiomyopathy or arrhythmia conditions are reported in Ms. Silva's paternal aunts or uncles. Paternal grandparents (80s) have no reported health concerns.     3-generation family history obtained and otherwise negative for history of intellectual disability/developmental delay, birth defects, or 3 or more miscarriages unless otherwise noted above. Consanguinity denied.    Social History:  Lives with her four children. The family resides in [place], [place].    DIAGNOSTIC STUDIES REVIEWED:     GENETIC TEST RESULTS:    [facility] Arrhythmia and Cardiomyopathy Comprehensive Panel (25):      PRIOR RADIOLOGY, IMAGING, AND OTHER STUDIES:      Transthoracic echo (TTE) complete (25):    Left Ventricle: The left ventricle is at the upper limits of normal in size. Normal wall thickness. Moderate global hypokinesis present. There is mildly reduced systolic function with a visually estimated ejection fraction of 40 - 45%. Ejection fraction is approximately 43%. Quantitated ejection fraction is 44%. There is normal diastolic function.    Right Ventricle: Systolic function is normal.    IVC/SVC: Normal venous pressure at 3 mmHg.     Rhythm strip (25):  Vent. Rate :  63 BPM     Atrial Rate :  63 BPM      P-R Int : 154 ms          QRS Dur :  84 ms       QT Int : 448 ms       P-R-T Axes :  53  74  27 degrees     QTcB Int : 458 ms     Normal sinus rhythm   Normal ECG   When compared with ECG of 09-Sep-2024 09:06,   No significant change was found     EKG 12-lead (24):  Vent. Rate : 059 BPM     Atrial Rate :  059 BPM      P-R Int : 158 ms          QRS Dur : 090 ms       QT Int : 450 ms       P-R-T Axes : 048 033 042 degrees      QTc Int : 445 ms     Sinus bradycardia   Otherwise normal ECG   When compared with ECG of 09-SEP-2024 09:04,   No significant change was found     2D Echo w/ Color Flow Doppler (02/17/17):   1 - Severely depressed left ventricular systolic function (EF 20-25%).     2 - Mild left atrial enlargement.     3 - Low normal to mildly depressed right ventricular systolic function .     4 - The estimated PA systolic pressure is 26 mmHg.     5 - Mild tricuspid regurgitation.     ASSESSMENT/DISCUSSION:  Cj Silva is a 39 y.o. female with postpartum dilated cardiomyopathy and heart failure with reduced ejection fraction (HFrEF). She also has a family history of heart failure in one brother, her mother, one maternal aunt, and one maternal cousin. 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     We discussed that a variant of uncertain significance (VUS) is a change that we do not currently know the impact of. There are changes in genes that we know can be disease-causing and changes that are part of normal human variation; a VUS is a change that we cannot classify into one of those categories at this time. A VUS is not a diagnosis. Variants are evaluated to determine its classification using evidence from population and gene/disease-specific databases, in silico prediction tools, laboratory variant databases, and the relevant scientific literature. Additional information about the VUS results identified in Ms. Silva are highlighted below:    GJA5 [c.893C>G (p.Yeb020Fah)], VARIANT OF UNCERTAIN SIGNIFICANCE:  Known pathogenic  variants in GJA5 are associated with autosomal dominant atrial fibrillation. Additionally, the GJA5 gene has preliminary evidence supporting a correlation with autosomal dominant tetralogy of Fallot. Review of the specific GJA5 variant identified in Ms. Silva indicates this sequence change replaces threonine, which is neutral and polar, with serine, which is neutral and polar, at codon 298 of the GJA5 protein. This variant is not present in general population databases ([employer] no frequency). This variant has not been reported in the literature in individuals affected with GJA5-related conditions. This variant has been reported as a VUS by one lab (ClinVar Variation ID [id number]). [employer] Evidence Modeling of protein sequence and biophysical properties indicates that this missense variant is not expected to disrupt GJA5 protein function with a negative predictive value of 80%. Ms. Silva denies any personal or family history of atrial fibrillation whgiuch further reduces suspicion of this result being clinically significant for the family at this time. Over time, additional information will be available to clarify the significance of this variant.     MYH7 [c.5757G>C (p.[id number])], VARIANT OF UNCERTAIN SIGNIFICANCE:  Known pathogenic variants in MYH7 are associated with various types of cardiomyopathy and skeletal muscle disease such as autosomal dominant hypertrophic cardiomyopathy (HCM), dilated cardiomyopathy (DCM), left ventricular noncompaction (LVNC), and Aki distal myopathy (MPD1). It is also associated with autosomal dominant and recessive myosin storage myopathy (MSMA) and autosomal dominant scapuloperoneal myopathy (SPMM). Review of the MYH7 variant identified in Ms. Silva indicates this sequence change replaces lysine, which is basic and polar, with asparagine, which is neutral and polar, at codon 1919 of the MYH7 protein. This variant is not present in general population databases (gnomAD no  frequency). This missense change has been reported in the literature in at least one individual with HCM. This variant has been reported as a VUS by 5 labs (ClinVar Variation ID [id number]). [employer] Evidence Modeling of protein sequence and biophysical properties indicates that this missense variant is expected to disrupt MYH7 protein function with a positive predictive value of 95%. Over time, additional information will be available to clarify the significance of this variant.     We discussed that these results do not provide a genetic diagnosis for Ms. Silva's cardiac history at this time while their clinical significance is still unknown. Ms. Silva is recommended to continue cardiac care as indicated by cardiology. Ongoing periodic cardiac screening for all of Ms. Silva's first-degree relatives, including echocardiogram, EKG, and evaluation by a cardiologist, is still recommended in light of the family history of cardiomyopathy and heart failure per guidelines by the Heart Failure Society of [place].    We also discussed that [employer] offers the option for complimentary family studies for the MYH7 variant identified as part of their VUS Resolution Program. Testing an affected relative, such as Ms. Silva's brother or mother who have a history of heart failure, could aid in clarifying its potential clinical significance especially if the MYH7 variant is found to segregate with disease in the family. Ms. Silva reports her parents are based in [place] and her brother with heart failure is currently in [place]; I offered to share a link for information on local genetic clinics where they can consider genetic testing if interested.     Ms. Silva verbalized understanding of the information discussed and all questions were answered. She was encouraged to contact our office if any additional questions or concerns arise.     RECOMMENDATIONS/PLAN:  Continue to follow-up with cardiology for  management.  MYH7 VUS resolution testing available for affected relatives  Periodic cardiac surveillance recommended for first-degree relatives given family history of cardiomyopathy    REFERENCES:  ROSA Herzog., RANDI Abad., ROSA Castellanos., RANDI Geller., TYREE Brink., FADY Ordonez, & RANDI Del Toro. (2004). Comprehensive analysis of the beta-myosin heavy chain gene in 389 unrelated patients with hypertrophic cardiomyopathy. Journal of the American College of Cardiology, 44(3), 602-610. https://doi.org/10.1016/j.jacc.2004.04.039  RANULFO Reinoso., RANDI Hill., MIQUEL Alberto., LEE Canseco., Loki PWil MARI., FARRAH Nieto., FADY Parra, RANDI Gonsalez RWil OLIVAS., RANDI Sifuentes, & ROSA Baumann. (2018). Genetic Evaluation of Cardiomyopathy-A Heart Failure Society of [place] Practice Guideline. Journal of cardiac failure, 24(5), 281-302. https://doi.org/10.1016/j.cardfail.2018.03.004    TIME SPENT:   Face to Face time with patient: 12 minutes with over 50% spent counseling  55 minutes of total time spent today on the encounter, which includes face to face time and non-face to face time preparing to see the patient (eg, review of tests), Obtaining and/or reviewing separately obtained history, Documenting clinical information in the electronic or other health record, Independently interpreting results (not separately reported) and communicating results to the patient/family/caregiver, or Care coordination (not separately reported).     Ashley Bear MS, [facility] [place]  Certified Genetic Counselor   [facility]    EXTERNAL CC:    Terra Euceda MD

## 2025-08-04 ENCOUNTER — PATIENT MESSAGE (OUTPATIENT)
Dept: GENETICS | Facility: CLINIC | Age: 40
End: 2025-08-04

## 2025-08-04 ENCOUNTER — OFFICE VISIT (OUTPATIENT)
Dept: GENETICS | Facility: CLINIC | Age: 40
End: 2025-08-04
Payer: MEDICARE

## 2025-08-04 DIAGNOSIS — I50.42 CHRONIC COMBINED SYSTOLIC AND DIASTOLIC HEART FAILURE: Primary | ICD-10-CM

## 2025-08-04 DIAGNOSIS — Z95.810 ICD (IMPLANTABLE CARDIOVERTER-DEFIBRILLATOR) IN PLACE: ICD-10-CM

## 2025-08-04 PROCEDURE — 96041 GENETIC COUNSELING SVC EA 30: CPT | Mod: 95,,,

## 2025-08-14 DIAGNOSIS — I50.42 CHRONIC COMBINED SYSTOLIC AND DIASTOLIC HEART FAILURE: Primary | ICD-10-CM

## (undated) DEVICE — COVER INSTR ELASTIC BAND 40X20

## (undated) DEVICE — DRESSING AQUACEL AG ADV 3.5X6

## (undated) DEVICE — DEVICE PLASMABLADE X 3.0S LT

## (undated) DEVICE — Device

## (undated) DEVICE — PAD DEFIB CADENCE ADULT R2

## (undated) DEVICE — ADHESIVE DERMABOND ADVANCED

## (undated) DEVICE — TOWEL OR DISP STRL BLUE 4/PK

## (undated) DEVICE — DRAPE INCISE IOBAN 2 23X17IN

## (undated) DEVICE — PACK PACER PERMANENT OMC

## (undated) DEVICE — ELECTRODE REM PLYHSV RETURN 9